# Patient Record
Sex: MALE | Race: OTHER | HISPANIC OR LATINO | URBAN - METROPOLITAN AREA
[De-identification: names, ages, dates, MRNs, and addresses within clinical notes are randomized per-mention and may not be internally consistent; named-entity substitution may affect disease eponyms.]

---

## 2022-05-08 ENCOUNTER — EMERGENCY (EMERGENCY)
Facility: HOSPITAL | Age: 47
LOS: 1 days | Discharge: HOME | End: 2022-05-08
Attending: EMERGENCY MEDICINE
Payer: SELF-PAY

## 2022-05-08 VITALS
TEMPERATURE: 99 F | HEART RATE: 96 BPM | SYSTOLIC BLOOD PRESSURE: 197 MMHG | OXYGEN SATURATION: 99 % | RESPIRATION RATE: 16 BRPM | DIASTOLIC BLOOD PRESSURE: 111 MMHG

## 2022-05-08 VITALS
TEMPERATURE: 98 F | HEART RATE: 120 BPM | DIASTOLIC BLOOD PRESSURE: 127 MMHG | SYSTOLIC BLOOD PRESSURE: 196 MMHG | OXYGEN SATURATION: 97 % | HEIGHT: 65 IN | RESPIRATION RATE: 20 BRPM

## 2022-05-08 LAB
ALBUMIN SERPL ELPH-MCNC: 4.6 G/DL — SIGNIFICANT CHANGE UP (ref 3.3–5)
ALP SERPL-CCNC: 111 U/L — SIGNIFICANT CHANGE UP (ref 40–120)
ALT FLD-CCNC: 60 U/L — HIGH (ref 10–45)
ANION GAP SERPL CALC-SCNC: 17 MMOL/L — SIGNIFICANT CHANGE UP (ref 5–17)
APPEARANCE UR: CLEAR — SIGNIFICANT CHANGE UP
APTT BLD: 27.9 SEC — SIGNIFICANT CHANGE UP (ref 27.5–35.5)
AST SERPL-CCNC: 73 U/L — HIGH (ref 10–40)
BACTERIA # UR AUTO: NEGATIVE — SIGNIFICANT CHANGE UP
BASE EXCESS BLDV CALC-SCNC: 2.6 MMOL/L — HIGH (ref -2–2)
BASE EXCESS BLDV CALC-SCNC: 3.9 MMOL/L — HIGH (ref -2–2)
BASOPHILS # BLD AUTO: 0.06 K/UL — SIGNIFICANT CHANGE UP (ref 0–0.2)
BASOPHILS NFR BLD AUTO: 0.8 % — SIGNIFICANT CHANGE UP (ref 0–2)
BILIRUB SERPL-MCNC: 0.5 MG/DL — SIGNIFICANT CHANGE UP (ref 0.2–1.2)
BILIRUB UR-MCNC: NEGATIVE — SIGNIFICANT CHANGE UP
BUN SERPL-MCNC: 10 MG/DL — SIGNIFICANT CHANGE UP (ref 7–23)
CA-I SERPL-SCNC: 1.04 MMOL/L — LOW (ref 1.15–1.33)
CA-I SERPL-SCNC: 1.15 MMOL/L — SIGNIFICANT CHANGE UP (ref 1.15–1.33)
CALCIUM SERPL-MCNC: 8.9 MG/DL — SIGNIFICANT CHANGE UP (ref 8.4–10.5)
CHLORIDE BLDV-SCNC: 104 MMOL/L — SIGNIFICANT CHANGE UP (ref 96–108)
CHLORIDE BLDV-SCNC: 106 MMOL/L — SIGNIFICANT CHANGE UP (ref 96–108)
CHLORIDE SERPL-SCNC: 100 MMOL/L — SIGNIFICANT CHANGE UP (ref 96–108)
CO2 BLDV-SCNC: 28 MMOL/L — HIGH (ref 22–26)
CO2 BLDV-SCNC: 29 MMOL/L — HIGH (ref 22–26)
CO2 SERPL-SCNC: 22 MMOL/L — SIGNIFICANT CHANGE UP (ref 22–31)
COLOR SPEC: SIGNIFICANT CHANGE UP
CREAT SERPL-MCNC: 0.72 MG/DL — SIGNIFICANT CHANGE UP (ref 0.5–1.3)
DIFF PNL FLD: NEGATIVE — SIGNIFICANT CHANGE UP
EGFR: 113 ML/MIN/1.73M2 — SIGNIFICANT CHANGE UP
EOSINOPHIL # BLD AUTO: 0.08 K/UL — SIGNIFICANT CHANGE UP (ref 0–0.5)
EOSINOPHIL NFR BLD AUTO: 1 % — SIGNIFICANT CHANGE UP (ref 0–6)
EPI CELLS # UR: 1 /HPF — SIGNIFICANT CHANGE UP
GAS PNL BLDV: 140 MMOL/L — SIGNIFICANT CHANGE UP (ref 136–145)
GAS PNL BLDV: 141 MMOL/L — SIGNIFICANT CHANGE UP (ref 136–145)
GAS PNL BLDV: SIGNIFICANT CHANGE UP
GLUCOSE BLDV-MCNC: 121 MG/DL — HIGH (ref 70–99)
GLUCOSE BLDV-MCNC: 99 MG/DL — SIGNIFICANT CHANGE UP (ref 70–99)
GLUCOSE SERPL-MCNC: 107 MG/DL — HIGH (ref 70–99)
GLUCOSE UR QL: ABNORMAL
HCO3 BLDV-SCNC: 27 MMOL/L — SIGNIFICANT CHANGE UP (ref 22–29)
HCO3 BLDV-SCNC: 28 MMOL/L — SIGNIFICANT CHANGE UP (ref 22–29)
HCT VFR BLD CALC: 42 % — SIGNIFICANT CHANGE UP (ref 39–50)
HCT VFR BLDA CALC: 41 % — SIGNIFICANT CHANGE UP (ref 39–51)
HCT VFR BLDA CALC: 44 % — SIGNIFICANT CHANGE UP (ref 39–51)
HGB BLD CALC-MCNC: 13.7 G/DL — SIGNIFICANT CHANGE UP (ref 12.6–17.4)
HGB BLD CALC-MCNC: 14.7 G/DL — SIGNIFICANT CHANGE UP (ref 12.6–17.4)
HGB BLD-MCNC: 14.1 G/DL — SIGNIFICANT CHANGE UP (ref 13–17)
IMM GRANULOCYTES NFR BLD AUTO: 0.5 % — SIGNIFICANT CHANGE UP (ref 0–1.5)
INR BLD: 0.91 RATIO — SIGNIFICANT CHANGE UP (ref 0.88–1.16)
KETONES UR-MCNC: NEGATIVE — SIGNIFICANT CHANGE UP
LACTATE BLDV-MCNC: 1.6 MMOL/L — SIGNIFICANT CHANGE UP (ref 0.7–2)
LACTATE BLDV-MCNC: 2.5 MMOL/L — HIGH (ref 0.7–2)
LEUKOCYTE ESTERASE UR-ACNC: NEGATIVE — SIGNIFICANT CHANGE UP
LIDOCAIN IGE QN: 79 U/L — HIGH (ref 7–60)
LYMPHOCYTES # BLD AUTO: 1.58 K/UL — SIGNIFICANT CHANGE UP (ref 1–3.3)
LYMPHOCYTES # BLD AUTO: 20 % — SIGNIFICANT CHANGE UP (ref 13–44)
MCHC RBC-ENTMCNC: 29 PG — SIGNIFICANT CHANGE UP (ref 27–34)
MCHC RBC-ENTMCNC: 33.6 GM/DL — SIGNIFICANT CHANGE UP (ref 32–36)
MCV RBC AUTO: 86.4 FL — SIGNIFICANT CHANGE UP (ref 80–100)
MONOCYTES # BLD AUTO: 0.43 K/UL — SIGNIFICANT CHANGE UP (ref 0–0.9)
MONOCYTES NFR BLD AUTO: 5.4 % — SIGNIFICANT CHANGE UP (ref 2–14)
NEUTROPHILS # BLD AUTO: 5.71 K/UL — SIGNIFICANT CHANGE UP (ref 1.8–7.4)
NEUTROPHILS NFR BLD AUTO: 72.3 % — SIGNIFICANT CHANGE UP (ref 43–77)
NITRITE UR-MCNC: NEGATIVE — SIGNIFICANT CHANGE UP
NRBC # BLD: 0 /100 WBCS — SIGNIFICANT CHANGE UP (ref 0–0)
PCO2 BLDV: 38 MMHG — LOW (ref 42–55)
PCO2 BLDV: 41 MMHG — LOW (ref 42–55)
PH BLDV: 7.43 — SIGNIFICANT CHANGE UP (ref 7.32–7.43)
PH BLDV: 7.47 — HIGH (ref 7.32–7.43)
PH UR: 6.5 — SIGNIFICANT CHANGE UP (ref 5–8)
PLATELET # BLD AUTO: 155 K/UL — SIGNIFICANT CHANGE UP (ref 150–400)
PO2 BLDV: 71 MMHG — HIGH (ref 25–45)
PO2 BLDV: 75 MMHG — HIGH (ref 25–45)
POTASSIUM BLDV-SCNC: 3.4 MMOL/L — LOW (ref 3.5–5.1)
POTASSIUM BLDV-SCNC: 3.8 MMOL/L — SIGNIFICANT CHANGE UP (ref 3.5–5.1)
POTASSIUM SERPL-MCNC: 3.6 MMOL/L — SIGNIFICANT CHANGE UP (ref 3.5–5.3)
POTASSIUM SERPL-SCNC: 3.6 MMOL/L — SIGNIFICANT CHANGE UP (ref 3.5–5.3)
PROT SERPL-MCNC: 6.7 G/DL — SIGNIFICANT CHANGE UP (ref 6–8.3)
PROT UR-MCNC: ABNORMAL
PROTHROM AB SERPL-ACNC: 10.5 SEC — SIGNIFICANT CHANGE UP (ref 10.5–13.4)
RBC # BLD: 4.86 M/UL — SIGNIFICANT CHANGE UP (ref 4.2–5.8)
RBC # FLD: 12.1 % — SIGNIFICANT CHANGE UP (ref 10.3–14.5)
RBC CASTS # UR COMP ASSIST: 1 /HPF — SIGNIFICANT CHANGE UP (ref 0–4)
SAO2 % BLDV: 95.5 % — HIGH (ref 67–88)
SAO2 % BLDV: 95.5 % — HIGH (ref 67–88)
SARS-COV-2 RNA SPEC QL NAA+PROBE: SIGNIFICANT CHANGE UP
SODIUM SERPL-SCNC: 139 MMOL/L — SIGNIFICANT CHANGE UP (ref 135–145)
SP GR SPEC: 1.04 — HIGH (ref 1.01–1.02)
UROBILINOGEN FLD QL: NEGATIVE — SIGNIFICANT CHANGE UP
WBC # BLD: 7.9 K/UL — SIGNIFICANT CHANGE UP (ref 3.8–10.5)
WBC # FLD AUTO: 7.9 K/UL — SIGNIFICANT CHANGE UP (ref 3.8–10.5)
WBC UR QL: 0 /HPF — SIGNIFICANT CHANGE UP (ref 0–5)

## 2022-05-08 PROCEDURE — 36415 COLL VENOUS BLD VENIPUNCTURE: CPT

## 2022-05-08 PROCEDURE — 74177 CT ABD & PELVIS W/CONTRAST: CPT | Mod: 26,MA

## 2022-05-08 PROCEDURE — 96374 THER/PROPH/DIAG INJ IV PUSH: CPT | Mod: XU

## 2022-05-08 PROCEDURE — 71045 X-RAY EXAM CHEST 1 VIEW: CPT

## 2022-05-08 PROCEDURE — 85730 THROMBOPLASTIN TIME PARTIAL: CPT

## 2022-05-08 PROCEDURE — 85610 PROTHROMBIN TIME: CPT

## 2022-05-08 PROCEDURE — 93005 ELECTROCARDIOGRAM TRACING: CPT | Mod: 76

## 2022-05-08 PROCEDURE — 81001 URINALYSIS AUTO W/SCOPE: CPT

## 2022-05-08 PROCEDURE — 74177 CT ABD & PELVIS W/CONTRAST: CPT | Mod: MA

## 2022-05-08 PROCEDURE — 82330 ASSAY OF CALCIUM: CPT

## 2022-05-08 PROCEDURE — 99285 EMERGENCY DEPT VISIT HI MDM: CPT | Mod: 25

## 2022-05-08 PROCEDURE — 71045 X-RAY EXAM CHEST 1 VIEW: CPT | Mod: 26

## 2022-05-08 PROCEDURE — 83605 ASSAY OF LACTIC ACID: CPT

## 2022-05-08 PROCEDURE — 87086 URINE CULTURE/COLONY COUNT: CPT

## 2022-05-08 PROCEDURE — U0003: CPT

## 2022-05-08 PROCEDURE — 85014 HEMATOCRIT: CPT

## 2022-05-08 PROCEDURE — 82803 BLOOD GASES ANY COMBINATION: CPT

## 2022-05-08 PROCEDURE — 83690 ASSAY OF LIPASE: CPT

## 2022-05-08 PROCEDURE — 93010 ELECTROCARDIOGRAM REPORT: CPT

## 2022-05-08 PROCEDURE — 84132 ASSAY OF SERUM POTASSIUM: CPT

## 2022-05-08 PROCEDURE — 85025 COMPLETE CBC W/AUTO DIFF WBC: CPT

## 2022-05-08 PROCEDURE — 85018 HEMOGLOBIN: CPT

## 2022-05-08 PROCEDURE — U0005: CPT

## 2022-05-08 PROCEDURE — 80053 COMPREHEN METABOLIC PANEL: CPT

## 2022-05-08 PROCEDURE — 96375 TX/PRO/DX INJ NEW DRUG ADDON: CPT | Mod: XU

## 2022-05-08 PROCEDURE — 82947 ASSAY GLUCOSE BLOOD QUANT: CPT

## 2022-05-08 PROCEDURE — 82435 ASSAY OF BLOOD CHLORIDE: CPT

## 2022-05-08 PROCEDURE — 84295 ASSAY OF SERUM SODIUM: CPT

## 2022-05-08 PROCEDURE — 99285 EMERGENCY DEPT VISIT HI MDM: CPT

## 2022-05-08 RX ORDER — SODIUM CHLORIDE 9 MG/ML
1000 INJECTION INTRAMUSCULAR; INTRAVENOUS; SUBCUTANEOUS ONCE
Refills: 0 | Status: COMPLETED | OUTPATIENT
Start: 2022-05-08 | End: 2022-05-08

## 2022-05-08 RX ORDER — FAMOTIDINE 10 MG/ML
20 INJECTION INTRAVENOUS ONCE
Refills: 0 | Status: COMPLETED | OUTPATIENT
Start: 2022-05-08 | End: 2022-05-08

## 2022-05-08 RX ORDER — MORPHINE SULFATE 50 MG/1
4 CAPSULE, EXTENDED RELEASE ORAL ONCE
Refills: 0 | Status: DISCONTINUED | OUTPATIENT
Start: 2022-05-08 | End: 2022-05-08

## 2022-05-08 RX ADMIN — MORPHINE SULFATE 4 MILLIGRAM(S): 50 CAPSULE, EXTENDED RELEASE ORAL at 13:28

## 2022-05-08 RX ADMIN — FAMOTIDINE 20 MILLIGRAM(S): 10 INJECTION INTRAVENOUS at 14:48

## 2022-05-08 RX ADMIN — Medication 30 MILLILITER(S): at 14:48

## 2022-05-08 RX ADMIN — Medication 25 MILLIGRAM(S): at 15:13

## 2022-05-08 RX ADMIN — SODIUM CHLORIDE 1000 MILLILITER(S): 9 INJECTION INTRAMUSCULAR; INTRAVENOUS; SUBCUTANEOUS at 12:28

## 2022-05-08 RX ADMIN — MORPHINE SULFATE 4 MILLIGRAM(S): 50 CAPSULE, EXTENDED RELEASE ORAL at 12:28

## 2022-05-08 NOTE — ED ADULT NURSE REASSESSMENT NOTE - NS ED NURSE REASSESS COMMENT FT1
Report received from MARICRUZ Diehl. Pt AAOx4, NAD, resp nonlabored, skin warm/dry, resting comfortably in bed with call bell at bedside. Pt denies headache, dizziness, chest pain, palpitations, SOB, abd pain, n/v/d, urinary symptoms, fevers, chills, weakness at this time. Pt awaiting dispo. Safety maintained.

## 2022-05-08 NOTE — ED PROVIDER NOTE - PROGRESS NOTE DETAILS
Chuy Salomon PGY-2 patient having elevated BP in ED, spoke to patient, chronic as per patient, does not follow up with any PMD. will provide PMD upon discharge Chuy Salomon PGY-2 pain improved, tolerating PO, dispo with plan to go to PCP for bp management and further gi pain control

## 2022-05-08 NOTE — ED PROVIDER NOTE - OBJECTIVE STATEMENT
[id number]     47 y/o M hx of alc abuse, last drink earlier today 2 drinks, depression p/w abd pain x 6 days. pain is throughout abd, worsened today, now 10/10. decrease appetite. otherwise pt denies any fever, chills, cp, palpitations, sob, v/d, dysuria, numbness, headache

## 2022-05-08 NOTE — ED PROVIDER NOTE - PATIENT PORTAL LINK FT
You can access the FollowMyHealth Patient Portal offered by [facility] by registering at the following website: [email or web address]. By joining [employer]’s FollowMyHealth portal, you will also be able to view your health information using other applications (apps) compatible with our system.

## 2022-05-08 NOTE — ED PROVIDER NOTE - ATTENDING CONTRIBUTION TO CARE
attending Carlita: 46yM h/o alcohol abuse, last drink earlier today, depression p/w mid abd pain x 6 days. Assoc with decreased appetite. Exam as above. Differential including pancreatitis, gastritis, colitis, perforated ulcer, much less likely aortic pathology. Will obtain labs, symptomatic treatment, upright cxr, CT A/P, reassess

## 2022-05-08 NOTE — ED ADULT NURSE NOTE - NSIMPLEMENTINTERV_GEN_ALL_ED
Implemented All Fall Risk Interventions:  [place] to call system. Call bell, personal items and telephone within reach. Instruct patient to call for assistance. Room bathroom lighting operational. Non-slip footwear when patient is off stretcher. Physically safe environment: no spills, clutter or unnecessary equipment. Stretcher in lowest position, wheels locked, appropriate side rails in place. Provide visual cue, wrist band, yellow gown, etc. Monitor gait and stability. Monitor for mental status changes and reorient to person, place, and time. Review medications for side effects contributing to fall risk. Reinforce activity limits and safety measures with patient and family.

## 2022-05-08 NOTE — ED PROVIDER NOTE - NSFOLLOWUPINSTRUCTIONS_ED_ALL_ED_FT
Please take N20 bus on top [facility]       Abdominal Pain, Adult      Pain in the abdomen (abdominal pain) can be caused by many things. Often, abdominal pain is not serious and it gets better with no treatment or by being treated at home. However, sometimes abdominal pain is serious.    Your health care provider will ask questions about your medical history and do a physical exam to try to determine the cause of your abdominal pain.      Follow these instructions at home:    Medicines     •Take over-the-counter and prescription medicines only as told by your health care provider.      • Do not take a laxative unless told by your health care provider.        General instructions      •Watch your condition for any changes.      •Drink enough fluid to keep your urine pale yellow.      •Keep all follow-up visits as told by your health care provider. This is important.        Contact a health care provider if:    •Your abdominal pain changes or gets worse.      •You are not hungry or you lose weight without trying.      •You are constipated or have diarrhea for more than 2–3 days.      •You have pain when you urinate or have a bowel movement.      •Your abdominal pain wakes you up at night.      •Your pain gets worse with meals, after eating, or with certain foods.      •You are vomiting and cannot keep anything down.      •You have a fever.      •You have blood in your urine.        Get help right away if:    •Your pain does not go away as soon as your health care provider told you to expect.      •You cannot stop vomiting.      •Your pain is only in areas of the abdomen, such as the right side or the left lower portion of the abdomen. Pain on the right side could be caused by appendicitis.      •You have bloody or black stools, or stools that look like tar.      •You have severe pain, cramping, or bloating in your abdomen.    •You have signs of dehydration, such as:  •Dark urine, very little urine, or no urine.      •Cracked lips.      •Dry mouth.      •Sunken eyes.      •Sleepiness.      •Weakness.        •You have trouble breathing or chest pain.        Summary    •Often, abdominal pain is not serious and it gets better with no treatment or by being treated at home. However, sometimes abdominal pain is serious.      •Watch your condition for any changes.      •Take over-the-counter and prescription medicines only as told by your health care provider.      •Contact a health care provider if your abdominal pain changes or gets worse.      •Get help right away if you have severe pain, cramping, or bloating in your abdomen.      This information is not intended to replace advice given to you by your health care provider. Make sure you discuss any questions you have with your health care provider.

## 2022-05-08 NOTE — ED PROVIDER NOTE - NSFOLLOWUPCLINICS_GEN_ALL_ED_FT
[facility]  Family Medicine  [address] [place] [id number]  Phone: [phone]  Fax:     Gastroenterology at [facility]  Gastroenterology  [address] [place] [id number]  Phone: [phone]  Fax:

## 2022-05-08 NOTE — ED PROVIDER NOTE - CLINICAL SUMMARY MEDICAL DECISION MAKING FREE TEXT BOX
45 y/o M hx of alc abuse, last drink earlier today 2 drinks, depression p/w abd pain x 6 days.   Differential diagnosis includes: pancreatitis, lionel, PUD, perf viscous given severe pain   Plan: labs, UA, CT AP***, pain control, serial reassessment

## 2022-05-08 NOTE — ED ADULT NURSE NOTE - CCCP TRG CHIEF CMPLNT
Patient requests all Lab, Cardiology, and Radiology Results on their Discharge Instructions
abdominal pain

## 2022-05-08 NOTE — ED PROVIDER NOTE - PHYSICAL EXAMINATION
Vital signs reviewed  GENERAL: in distress due to pain  HEAD: NCAT  EYES: Anicteric  ENT: MMM  NECK: Supple, non tender  RESPIRATORY: Normal respiratory effort. CTA B/L. No wheezing, rales, rhonchi  CARDIOVASCULAR: Regular rate and rhythm  ABDOMEN: Soft. Nondistended. generalized tender, guarding   MUSCULOSKELETAL/EXTREMITIES: Brisk cap refill. 2+ radial pulses. No leg edema.  SKIN:  Warm and dry  NEURO: AAOx3. No gross FND.  PSYCHIATRIC: Cooperative. Affect appropriate.

## 2022-05-08 NOTE — ED ADULT NURSE NOTE - OBJECTIVE STATEMENT
46 year old male presented to ED [place] speaking,  [id number], with c/o of constant sharp abdominal pain x6 days, pt reports 'drinking is the only thing that helps the pain'. hx ETOH abuse, last drink today (2 drinks), hx depression. pt denies CP, SOB, nausea/vomiting, numbness/tingling, fever, cough, chills, dizziness, headache, blurred vision, neuro intact. pt a&ox3, lung sounds clear, heart rate regular, on cardiac monitor, EKG performed handed to MD, abdomen soft nontender nondistended to palp. skin intact. IV in right AC 20G and patent. Will continue to monitor and assess while offering support and reassurance.

## 2022-05-09 NOTE — ED POST DISCHARGE NOTE - RESULT SUMMARY
incidental findings: pulmonary density in the RLL, hepatic steatosis , diffuse bladder wall thickening.

## 2022-05-10 LAB
CULTURE RESULTS: SIGNIFICANT CHANGE UP
SPECIMEN SOURCE: SIGNIFICANT CHANGE UP

## 2022-06-20 NOTE — ED ADULT TRIAGE NOTE - PAIN RATING/NUMBER SCALE (0-10): ACTIVITY
Received Fax From:  [facility]  Cindy Muñoz RN   [address], [address]  [place] [address]  Direct(Phone):[phone]  Fax:[phone]    RE: Physician Order Needed    Message: Please review attached Plan of Care. If you agree with this plan, please sign, date and return to me directly.     Placed form in PCP Amairani Beaver NP mailbox for review and completion.    9

## 2024-05-25 ENCOUNTER — INPATIENT (INPATIENT)
Facility: HOSPITAL | Age: 49
LOS: 4 days | Discharge: HOME | DRG: 72 | End: 2024-05-30
Attending: HOSPITALIST | Admitting: STUDENT IN AN ORGANIZED HEALTH CARE EDUCATION/TRAINING PROGRAM
Payer: SELF-PAY

## 2024-05-25 VITALS
DIASTOLIC BLOOD PRESSURE: 109 MMHG | HEART RATE: 88 BPM | RESPIRATION RATE: 20 BRPM | SYSTOLIC BLOOD PRESSURE: 213 MMHG | TEMPERATURE: 98 F | OXYGEN SATURATION: 100 %

## 2024-05-25 DIAGNOSIS — R04.0 EPISTAXIS: ICD-10-CM

## 2024-05-25 DIAGNOSIS — G93.9 DISORDER OF BRAIN, UNSPECIFIED: ICD-10-CM

## 2024-05-25 DIAGNOSIS — G93.89 OTHER SPECIFIED DISORDERS OF BRAIN: ICD-10-CM

## 2024-05-25 DIAGNOSIS — F10.10 ALCOHOL ABUSE, UNCOMPLICATED: ICD-10-CM

## 2024-05-25 DIAGNOSIS — R56.9 UNSPECIFIED CONVULSIONS: ICD-10-CM

## 2024-05-25 DIAGNOSIS — Z29.9 ENCOUNTER FOR PROPHYLACTIC MEASURES, UNSPECIFIED: ICD-10-CM

## 2024-05-25 DIAGNOSIS — I10 ESSENTIAL (PRIMARY) HYPERTENSION: ICD-10-CM

## 2024-05-25 DIAGNOSIS — J01.90 ACUTE SINUSITIS, UNSPECIFIED: ICD-10-CM

## 2024-05-25 LAB
ALBUMIN SERPL ELPH-MCNC: 4.3 G/DL — SIGNIFICANT CHANGE UP (ref 3.3–5)
ALP SERPL-CCNC: 107 U/L — SIGNIFICANT CHANGE UP (ref 40–120)
ALT FLD-CCNC: 52 U/L — HIGH (ref 10–45)
AMPHET UR-MCNC: NEGATIVE — SIGNIFICANT CHANGE UP
ANION GAP SERPL CALC-SCNC: 19 MMOL/L — HIGH (ref 5–17)
APAP SERPL-MCNC: <15 UG/ML — SIGNIFICANT CHANGE UP (ref 10–30)
APPEARANCE UR: CLEAR — SIGNIFICANT CHANGE UP
APTT BLD: 25.6 SEC — SIGNIFICANT CHANGE UP (ref 24.5–35.6)
AST SERPL-CCNC: 61 U/L — HIGH (ref 10–40)
BACTERIA # UR AUTO: NEGATIVE /HPF — SIGNIFICANT CHANGE UP
BARBITURATES UR SCN-MCNC: NEGATIVE — SIGNIFICANT CHANGE UP
BASE EXCESS BLDV CALC-SCNC: 3.3 MMOL/L — HIGH (ref -2–3)
BASOPHILS # BLD AUTO: 0.04 K/UL — SIGNIFICANT CHANGE UP (ref 0–0.2)
BASOPHILS NFR BLD AUTO: 0.7 % — SIGNIFICANT CHANGE UP (ref 0–2)
BENZODIAZ UR-MCNC: NEGATIVE — SIGNIFICANT CHANGE UP
BILIRUB SERPL-MCNC: 1.3 MG/DL — HIGH (ref 0.2–1.2)
BILIRUB UR-MCNC: NEGATIVE — SIGNIFICANT CHANGE UP
BUN SERPL-MCNC: 8 MG/DL — SIGNIFICANT CHANGE UP (ref 7–23)
CA-I SERPL-SCNC: 1.06 MMOL/L — LOW (ref 1.15–1.33)
CALCIUM SERPL-MCNC: 8.6 MG/DL — SIGNIFICANT CHANGE UP (ref 8.4–10.5)
CAST: 0 /LPF — SIGNIFICANT CHANGE UP (ref 0–4)
CHLORIDE BLDV-SCNC: 93 MMOL/L — LOW (ref 96–108)
CHLORIDE SERPL-SCNC: 91 MMOL/L — LOW (ref 96–108)
CO2 BLDV-SCNC: 29 MMOL/L — HIGH (ref 22–26)
CO2 SERPL-SCNC: 23 MMOL/L — SIGNIFICANT CHANGE UP (ref 22–31)
COCAINE METAB.OTHER UR-MCNC: NEGATIVE — SIGNIFICANT CHANGE UP
COLOR SPEC: YELLOW — SIGNIFICANT CHANGE UP
CREAT SERPL-MCNC: 0.81 MG/DL — SIGNIFICANT CHANGE UP (ref 0.5–1.3)
DIFF PNL FLD: ABNORMAL
EGFR: 107 ML/MIN/1.73M2 — SIGNIFICANT CHANGE UP
EOSINOPHIL # BLD AUTO: 0.02 K/UL — SIGNIFICANT CHANGE UP (ref 0–0.5)
EOSINOPHIL NFR BLD AUTO: 0.3 % — SIGNIFICANT CHANGE UP (ref 0–6)
ETHANOL SERPL-MCNC: <10 MG/DL — SIGNIFICANT CHANGE UP (ref 0–10)
FLUAV AG NPH QL: SIGNIFICANT CHANGE UP
FLUBV AG NPH QL: SIGNIFICANT CHANGE UP
GAS PNL BLDV: 129 MMOL/L — LOW (ref 136–145)
GAS PNL BLDV: SIGNIFICANT CHANGE UP
GLUCOSE BLDV-MCNC: 235 MG/DL — HIGH (ref 70–99)
GLUCOSE SERPL-MCNC: 232 MG/DL — HIGH (ref 70–99)
GLUCOSE UR QL: 500 MG/DL
HCO3 BLDV-SCNC: 28 MMOL/L — SIGNIFICANT CHANGE UP (ref 22–29)
HCT VFR BLD CALC: 40.4 % — SIGNIFICANT CHANGE UP (ref 39–50)
HCT VFR BLDA CALC: 42 % — SIGNIFICANT CHANGE UP (ref 39–51)
HGB BLD CALC-MCNC: 14.1 G/DL — SIGNIFICANT CHANGE UP (ref 12.6–17.4)
HGB BLD-MCNC: 13.6 G/DL — SIGNIFICANT CHANGE UP (ref 13–17)
IMM GRANULOCYTES NFR BLD AUTO: 0.5 % — SIGNIFICANT CHANGE UP (ref 0–0.9)
INR BLD: 1.03 RATIO — SIGNIFICANT CHANGE UP (ref 0.85–1.18)
KETONES UR-MCNC: ABNORMAL MG/DL
LACTATE BLDV-MCNC: 1.5 MMOL/L — SIGNIFICANT CHANGE UP (ref 0.5–2)
LACTATE BLDV-MCNC: 5 MMOL/L — CRITICAL HIGH (ref 0.5–2)
LEUKOCYTE ESTERASE UR-ACNC: NEGATIVE — SIGNIFICANT CHANGE UP
LIDOCAIN IGE QN: 51 U/L — SIGNIFICANT CHANGE UP (ref 7–60)
LYMPHOCYTES # BLD AUTO: 0.65 K/UL — LOW (ref 1–3.3)
LYMPHOCYTES # BLD AUTO: 10.9 % — LOW (ref 13–44)
MAGNESIUM SERPL-MCNC: 2 MG/DL — SIGNIFICANT CHANGE UP (ref 1.6–2.6)
MCHC RBC-ENTMCNC: 28.9 PG — SIGNIFICANT CHANGE UP (ref 27–34)
MCHC RBC-ENTMCNC: 33.7 GM/DL — SIGNIFICANT CHANGE UP (ref 32–36)
MCV RBC AUTO: 85.8 FL — SIGNIFICANT CHANGE UP (ref 80–100)
METHADONE UR-MCNC: NEGATIVE — SIGNIFICANT CHANGE UP
MONOCYTES # BLD AUTO: 0.34 K/UL — SIGNIFICANT CHANGE UP (ref 0–0.9)
MONOCYTES NFR BLD AUTO: 5.7 % — SIGNIFICANT CHANGE UP (ref 2–14)
NEUTROPHILS # BLD AUTO: 4.86 K/UL — SIGNIFICANT CHANGE UP (ref 1.8–7.4)
NEUTROPHILS NFR BLD AUTO: 81.9 % — HIGH (ref 43–77)
NITRITE UR-MCNC: NEGATIVE — SIGNIFICANT CHANGE UP
NRBC # BLD: 0 /100 WBCS — SIGNIFICANT CHANGE UP (ref 0–0)
OPIATES UR-MCNC: NEGATIVE — SIGNIFICANT CHANGE UP
OXYCODONE UR-MCNC: NEGATIVE — SIGNIFICANT CHANGE UP
PCO2 BLDV: 41 MMHG — LOW (ref 42–55)
PCP SPEC-MCNC: SIGNIFICANT CHANGE UP
PCP UR-MCNC: NEGATIVE — SIGNIFICANT CHANGE UP
PH BLDV: 7.44 — HIGH (ref 7.32–7.43)
PH UR: 7 — SIGNIFICANT CHANGE UP (ref 5–8)
PLATELET # BLD AUTO: 130 K/UL — LOW (ref 150–400)
PO2 BLDV: 65 MMHG — HIGH (ref 25–45)
POTASSIUM BLDV-SCNC: 3 MMOL/L — LOW (ref 3.5–5.1)
POTASSIUM SERPL-MCNC: 3 MMOL/L — LOW (ref 3.5–5.3)
POTASSIUM SERPL-SCNC: 3 MMOL/L — LOW (ref 3.5–5.3)
PROT SERPL-MCNC: 7.4 G/DL — SIGNIFICANT CHANGE UP (ref 6–8.3)
PROT UR-MCNC: 30 MG/DL
PROTHROM AB SERPL-ACNC: 11.3 SEC — SIGNIFICANT CHANGE UP (ref 9.5–13)
RBC # BLD: 4.71 M/UL — SIGNIFICANT CHANGE UP (ref 4.2–5.8)
RBC # FLD: 12.6 % — SIGNIFICANT CHANGE UP (ref 10.3–14.5)
RBC CASTS # UR COMP ASSIST: 0 /HPF — SIGNIFICANT CHANGE UP (ref 0–4)
REVIEW: SIGNIFICANT CHANGE UP
RSV RNA NPH QL NAA+NON-PROBE: SIGNIFICANT CHANGE UP
SALICYLATES SERPL-MCNC: <2 MG/DL — LOW (ref 15–30)
SAO2 % BLDV: 93.5 % — HIGH (ref 67–88)
SARS-COV-2 RNA SPEC QL NAA+PROBE: SIGNIFICANT CHANGE UP
SODIUM SERPL-SCNC: 133 MMOL/L — LOW (ref 135–145)
SP GR SPEC: 1.01 — SIGNIFICANT CHANGE UP (ref 1–1.03)
SQUAMOUS # UR AUTO: 0 /HPF — SIGNIFICANT CHANGE UP (ref 0–5)
THC UR QL: NEGATIVE — SIGNIFICANT CHANGE UP
TROPONIN T, HIGH SENSITIVITY RESULT: 28 NG/L — SIGNIFICANT CHANGE UP (ref 0–51)
TSH SERPL-MCNC: 0.23 UIU/ML — LOW (ref 0.27–4.2)
UROBILINOGEN FLD QL: 1 MG/DL — SIGNIFICANT CHANGE UP (ref 0.2–1)
WBC # BLD: 5.94 K/UL — SIGNIFICANT CHANGE UP (ref 3.8–10.5)
WBC # FLD AUTO: 5.94 K/UL — SIGNIFICANT CHANGE UP (ref 3.8–10.5)
WBC UR QL: 0 /HPF — SIGNIFICANT CHANGE UP (ref 0–5)

## 2024-05-25 PROCEDURE — 74177 CT ABD & PELVIS W/CONTRAST: CPT | Mod: 26,MC

## 2024-05-25 PROCEDURE — 99285 EMERGENCY DEPT VISIT HI MDM: CPT

## 2024-05-25 PROCEDURE — 76377 3D RENDER W/INTRP POSTPROCES: CPT | Mod: 26

## 2024-05-25 PROCEDURE — 70450 CT HEAD/BRAIN W/O DYE: CPT | Mod: 26,MC

## 2024-05-25 PROCEDURE — 71045 X-RAY EXAM CHEST 1 VIEW: CPT | Mod: 26

## 2024-05-25 PROCEDURE — 70486 CT MAXILLOFACIAL W/O DYE: CPT | Mod: 26,MC

## 2024-05-25 PROCEDURE — ZZZZZ: CPT

## 2024-05-25 RX ORDER — LABETALOL HCL 100 MG
10 TABLET ORAL ONCE
Refills: 0 | Status: COMPLETED | OUTPATIENT
Start: 2024-05-25 | End: 2024-05-25

## 2024-05-25 RX ORDER — POTASSIUM CHLORIDE 20 MEQ
40 PACKET (EA) ORAL ONCE
Refills: 0 | Status: COMPLETED | OUTPATIENT
Start: 2024-05-25 | End: 2024-05-25

## 2024-05-25 RX ORDER — SODIUM CHLORIDE 9 MG/ML
500 INJECTION INTRAMUSCULAR; INTRAVENOUS; SUBCUTANEOUS ONCE
Refills: 0 | Status: COMPLETED | OUTPATIENT
Start: 2024-05-25 | End: 2024-05-25

## 2024-05-25 RX ORDER — LEVETIRACETAM 250 MG/1
1000 TABLET, FILM COATED ORAL ONCE
Refills: 0 | Status: COMPLETED | OUTPATIENT
Start: 2024-05-25 | End: 2024-05-25

## 2024-05-25 RX ORDER — HYDRALAZINE HCL 50 MG
10 TABLET ORAL ONCE
Refills: 0 | Status: COMPLETED | OUTPATIENT
Start: 2024-05-25 | End: 2024-05-25

## 2024-05-25 RX ORDER — LABETALOL HCL 100 MG
10 TABLET ORAL ONCE
Refills: 0 | Status: DISCONTINUED | OUTPATIENT
Start: 2024-05-25 | End: 2024-05-25

## 2024-05-25 RX ADMIN — LEVETIRACETAM 400 MILLIGRAM(S): 250 TABLET, FILM COATED ORAL at 21:29

## 2024-05-25 RX ADMIN — Medication 10 MILLIGRAM(S): at 17:30

## 2024-05-25 RX ADMIN — Medication 10 MILLIGRAM(S): at 22:01

## 2024-05-25 RX ADMIN — SODIUM CHLORIDE 500 MILLILITER(S): 9 INJECTION INTRAMUSCULAR; INTRAVENOUS; SUBCUTANEOUS at 16:04

## 2024-05-25 RX ADMIN — Medication 40 MILLIEQUIVALENT(S): at 18:57

## 2024-05-25 NOTE — H&P ADULT - ASSESSMENT
48 YOM with HTN, smoker, alcohol use disorder who presents today after witnessed syncope with seizure like activity, found to have left temporal lesion with surrounding vasogenic edema and periapical abscesses with acute on chronic sinusitis.

## 2024-05-25 NOTE — H&P ADULT - ATTENDING COMMENTS
47yo 73kg m, current smoker + alcohol use, w no known pmh, p/w tloc w witnessed seizure like activity; in er, found to have Periapical abscesses with acute on chronic sinusitis and Left temporal lesion with surrounding vasogenic edema (infection/abscess vs metastatic disease); c/f component of alcohol withdrawal; admit to medicine for further mgmt.     l temporal lesion    seizure    periapical abscess  anx as above  dental consult in am    acute on chronic sinusitis  symptomatic relief with analgesics/antipyretics, Flonase BID, intranasal saline 2gtts and 4x/day to b/l nares  abx as above  ent follow up in am    ?component of withdrawal  a/w thrombocytopenia, lytes insufficiencies (hypona, hypok, hypocl), a/w elevated lfts (tbili, ast:alt < 2:1)  Bac, utox wnl   neuroimaging as above  abdominal imaging shows hepatic steatosis  follow up cpk  avoid hepatotoxic agents;   trend lfts daily  ciwa protocol of symptom triggered therapy with po/ivp ativan   multivitamin, thiamine, folic acid supplementation  symptomatic relief with ppi, antiemetics, analgesics as needed  aggressive ivf resuscitation + lytes as needed; encourage po intake  fall, seizure, delirium, aspiration precaution with head end of bed elevated  psych consult  pt/ot eval + sw/cm consult for disposition     htn urgency  asymptomatic; no evidence of end organ damage  ekg shows nsr, lvh + repolarization changes, qtc ~522ms; trop 28->38  patient likely with long standing severely elevated bp; ?component of withdrawal  follow up lipid profile, a1c, tsh  monitor vitals q4h  start cozaar    ciwa protocol as above  slowly lower bp with goal bp ~160/100 w/n first 24-48 hours; titrate antihtn accordingly     otherwise, concur w a+p as described by resident above 49yo 73kg m, current smoker + alcohol use, w no known pmh, p/w tloc w witnessed seizure like activity; in er, found to have Periapical abscesses with acute on chronic sinusitis and Left temporal lesion with surrounding vasogenic edema (infection/abscess vs metastatic disease); c/f component of alcohol withdrawal; admit to medicine for further mgmt.     suspected seizure iso l temporal lesion    periapical abscess  abx as above  dental consult in am    acute on chronic sinusitis  symptomatic relief with analgesics/antipyretics, Flonase BID, intranasal saline 2gtts and 4x/day to b/l nares  abx as above  ent follow up in am    ?component of withdrawal  a/w thrombocytopenia, lytes insufficiencies (hypona, hypok, hypocl), a/w elevated lfts (tbili, ast:alt < 2:1)  Bac, utox wnl   neuroimaging as above  abdominal imaging shows hepatic steatosis  follow up cpk  avoid hepatotoxic agents;   trend lfts daily  ciwa protocol of symptom triggered therapy with po/ivp ativan   multivitamin, thiamine, folic acid supplementation  symptomatic relief with ppi, antiemetics, analgesics as needed  aggressive ivf resuscitation + lytes as needed; encourage po intake  fall, seizure, delirium, aspiration precaution with head end of bed elevated  psych consult  pt/ot eval + sw/cm consult for disposition     htn urgency  asymptomatic; no evidence of end organ damage  ekg shows nsr, lvh + repolarization changes, qtc ~522ms; trop 28->38  patient likely with long standing severely elevated bp; ?component of withdrawal  follow up lipid profile, a1c, tsh  monitor vitals q4h  start cozaar    ciwa protocol as above  slowly lower bp with goal bp ~160/100 w/n first 24-48 hours; titrate antihtn accordingly     otherwise, concur w a+p as described by resident above 49yo 73kg m, current smoker + alcohol use, w no known pmh, p/w tloc w witnessed seizure like activity; in er, found to have Periapical abscesses with acute on chronic sinusitis and Left temporal lesion with surrounding vasogenic edema (infection/abscess vs metastatic disease); c/f component of alcohol withdrawal; admit to medicine for further mgmt.     suspected seizure iso l temporal lesion  neuroimaging w cth showing left temporal lesion w surrounding vasogenic edema, nonspecific and differential diagnostic considerations would include metastatic disease, abscess, evolving contusion, and an ischemic event.   otherwise, afebrile, no leukocytosis, hds  nsgy consulted by er  follow up blood cultures, mr brain, eeg  frequent neurochecks  maintain fall, seizure, delirium, aspiration precautions; keep head end of bed elevated  keppra 500 bid  vanc + rocephin + flagyl  ciwa protocol as described below; prn ativan for breakthrough seizures  nsgy follow up in am  id consult in am    periapical abscess  abx as above  dental consult in am    acute on chronic sinusitis  symptomatic relief with analgesics/antipyretics, Flonase BID, intranasal saline 2gtts and 4x/day to b/l nares  abx as above  ent follow up in am    ?component of withdrawal  a/w thrombocytopenia, lytes insufficiencies (hypona, hypok, hypocl), abnormal lfts (tbili, ast:alt < 2:1)  Bac, utox wnl   neuroimaging as above  abdominal imaging shows hepatic steatosis  follow up cpk  avoid hepatotoxic agents;   trend lfts daily  ciwa protocol of symptom triggered therapy with po/ivp ativan   multivitamin, thiamine, folic acid supplementation  symptomatic relief with ppi, antiemetics, analgesics as needed  aggressive ivf resuscitation + lytes as needed; encourage po intake  fall, seizure, delirium, aspiration precaution with head end of bed elevated  psych consult  pt/ot eval + sw/cm consult for disposition     htn urgency  asymptomatic; no evidence of end organ damage  ekg shows nsr, lvh + repolarization changes, qtc ~522ms; trop 28->38  patient likely with long standing severely elevated bp; ?component of withdrawal  follow up lipid profile, a1c, tsh  monitor vitals q4h  start cozaar    ciwa protocol as above  slowly lower bp with goal bp ~160/100 w/n first 24-48 hours; titrate antihtn accordingly     otherwise, concur w a+p as described by resident above

## 2024-05-25 NOTE — H&P ADULT - PROBLEM SELECTOR PLAN 4
- Pt drinks 1L of whiskey? nightly before bed. Unclear if he develops withdral seizures or DTs  - Last drink 5/24 evening?  - Monitor on CIWA  - Thiamine (high dose), folate, multivitamin - Pt drinks 1L of whiskey? nightly before bed. Unclear if he develops withdrawal seizures or DTs  - Last drink 5/24 evening?  - Monitor on CIWA  - Thiamine (high dose), folate, multivitamin

## 2024-05-25 NOTE — H&P ADULT - NSHPPHYSICALEXAM_GEN_ALL_CORE
VITALS:   T(C): 37 (05-25-24 @ 23:14), Max: 37.1 (05-25-24 @ 14:06)  HR: 67 (05-25-24 @ 23:14) (67 - 95)  BP: 214/104 (05-26-24 @ 00:09) (187/99 - 226/113)  RR: 20 (05-25-24 @ 23:14) (18 - 25)  SpO2: 98% (05-25-24 @ 23:14) (95% - 100%)    GENERAL: NAD, lying in bed comfortably, sleeping  HEAD: Atraumatic, Normocephalic  EYES: EOMI, PERRLA, conjunctiva and sclera clear  ENT: Dry mucous membranes, nontender over sinuses  NECK: Supple, No JVD  CHEST/LUNG: Clear to auscultation bilaterally; No crackles or wheezing  HEART: Regular rate and rhythm; No murmurs.  Peripheral edema: None  ABDOMEN: Soft, nontender, nondistended  EXTREMITIES:  2+ radial pulses. Thick hands  NERVOUS SYSTEM:  A&Ox3, no gross lateralizing deficits   SKIN: No rashes or lesions VITALS:   T(C): 37 (05-25-24 @ 23:14), Max: 37.1 (05-25-24 @ 14:06)  HR: 67 (05-25-24 @ 23:14) (67 - 95)  BP: 214/104 (05-26-24 @ 00:09) (187/99 - 226/113)  RR: 20 (05-25-24 @ 23:14) (18 - 25)  SpO2: 98% (05-25-24 @ 23:14) (95% - 100%)    GENERAL: NAD, lying in bed comfortably, sleeping  HEAD: Atraumatic, Normocephalic  EYES: EOMI, PERRLA, conjunctiva and sclera clear  ENT: Dry mucous membranes, nontender over sinuses. + b/l tongue laceration  NECK: Supple, No JVD  CHEST/LUNG: Clear to auscultation bilaterally; No crackles or wheezing  HEART: Regular rate and rhythm; No murmurs.  Peripheral edema: None  ABDOMEN: Soft, nontender, nondistended  EXTREMITIES:  2+ radial pulses. Thick hands  NERVOUS SYSTEM:  A&Ox3, no gross lateralizing deficits   SKIN: No rashes or lesions VITALS:   T(C): 37 (05-25-24 @ 23:14), Max: 37.1 (05-25-24 @ 14:06)  HR: 67 (05-25-24 @ 23:14) (67 - 95)  BP: 214/104 (05-26-24 @ 00:09) (187/99 - 226/113)  RR: 20 (05-25-24 @ 23:14) (18 - 25)  SpO2: 98% (05-25-24 @ 23:14) (95% - 100%)    GENERAL: NAD, lying in bed comfortably, sleeping  HEAD: Atraumatic, Normocephalic  EYES: EOMI, PERRLA, conjunctiva and sclera clear  ENT: Dry mucous membranes, nontender over sinuses. + b/l tongue laceration  NECK: Supple, No JVD  CHEST/LUNG: Clear to auscultation bilaterally; No crackles or wheezing  HEART: Regular rate and rhythm; No murmurs.  Peripheral edema: None  ABDOMEN: Soft, nontender, nondistended  EXTREMITIES:  2+ radial pulses. Thick hands  NERVOUS SYSTEM:  A&Ox2, no gross lateralizing deficits   SKIN: No rashes or lesions

## 2024-05-25 NOTE — ED ADULT NURSE NOTE - NSFALLRISKINTERV_ED_ALL_ED

## 2024-05-25 NOTE — CONSULT NOTE ADULT - SUBJECTIVE AND OBJECTIVE BOX
p (1480)     HPI:  48M hx ETOH abuse presents s/p witnessed LOC w/ shaking. Now AOx3, [place] strongly. Unclear last etoh use but clinically c/f withdrawl. CTH shows edema in the L temporal lobe.     --Anticoagulation:    =====================  PAST MEDICAL HISTORY   Alcohol abuse      PAST SURGICAL HISTORY     No Known Allergies      MEDICATIONS:  Antibiotics:    Neuro:  LORazepam     Tablet 2 milliGRAM(s) Oral every 2 hours PRN  LORazepam   Injectable 2 milliGRAM(s) IV Push every 1 hour PRN    Other:  hydrALAZINE Injectable 10 milliGRAM(s) IV Push Once      SOCIAL HISTORY:   Occupation:   Marital Status:     FAMILY HISTORY:      ROS: Negative except per HPI    LABS:  PT/INR - ( 25 May 2024 14:28 )   PT: 11.3 sec;   INR: 1.03 ratio         PTT - ( 25 May 2024 14:28 )  PTT:25.6 sec                        13.6   5.94  )-----------( 130      ( 25 May 2024 14:27 )             40.4     05-25    133<L>  |  91<L>  |  8   ----------------------------<  232<H>  3.0<L>   |  23  |  0.81    Ca    8.6      25 May 2024 14:27  Mg     2.0     05-25    TPro  7.4  /  Alb  4.3  /  TBili  1.3<H>  /  DBili  x   /  AST  61<H>  /  ALT  52<H>  /  AlkPhos  107  05-25

## 2024-05-25 NOTE — H&P ADULT - HISTORY OF PRESENT ILLNESS
ED Course:  Vitals: Afebrile, 's/110's, HR 79-95, RR 18-25 99%RA  Labs: CBC with plt 130, CMP with N1 133, K 3.0, Cl 91, slightly elevated LFTs, VBG with lactate 5-->1.5  UA not concerning for infection. Tox screen negative. RVP negative  CT H/max face with left temporal lesion with surrounding vasogenic edema and periapical abscesses with acute on chronic sinusitis.  CT A/P with hepatic steatosis.  Given 0.5L NS, keppra, labetalol and hydralazine Seen by neurosurgery and recommended for medicine admission. The patient is a 48 YOM with no medical history (no PMD), who was brought to the ED after falling down and having seizure like activity. Pt reports 2 weeks of total body fevers (did not check temp). He also reports slight cough, nasal congestion, phlegm. He thinks that he is in the hospital today because of memory loss, which he says started today. Pt also says that he coughed up blood today. Prior to his fall today he reports that he had some rice and then fell. He drinks 1 bottle of brown liquor every night before he goes to bed. When asked if it was whiskey he said yes. He also says that if he doesnt drink he develops belly burning.     ED Course:  Vitals: Afebrile, 's/110's, HR 79-95, RR 18-25 99%RA  Labs: CBC with plt 130, CMP with N1 133, K 3.0, Cl 91, slightly elevated LFTs, VBG with lactate 5-->1.5  UA not concerning for infection. Tox screen negative. RVP negative  CT H/max face with left temporal lesion with surrounding vasogenic edema and periapical abscesses with acute on chronic sinusitis.  CT A/P with hepatic steatosis.  Given 0.5L NS, keppra, labetalol and hydralazine Seen by neurosurgery and recommended for medicine admission.    On my exam in the ED, patient is comfortable and in no distress despite his HTN. He is sleeping and asked multiple times for food. Additionally, he is AOx2, does not know the year. Did not seem phased when told he had a brain mass.

## 2024-05-25 NOTE — CONSULT NOTE ADULT - ASSESSMENT
48-year-old Wol[place] speaking male no known past medical history on no medications presenting with episode of syncope with shaking witnessed by a coworker.  Pt is daily drinker but did not drink today. He denies any known history of seizure disorder, intracranial abnormality.  Does not follow with a PCP. ENT consulted for CT finding of acute on chronic sinusitis. History obtained with use of  #[id number]. Pt states that he has been feeling tired with fever, cough, nasal congestion and phlegm for about 2 weeks. Pt c/o post nasal drip with bitter taste in the back of his throat. He has tried tylenol and 'tea" with minimal improvement of his symptoms. Pt also c/o coughing up blood this morning which stopped spontaneously. WBC 5, afebrile. on nasal endoscopy, b/l nasal cavities with purulent discharge. good sensation. no fungal colonization visualized. b/l dried blood at anterior nares, no active bleeding.  48-year-old Japanese speaking male no known past medical history on no medications presenting with episode of syncope with shaking witnessed by a coworker.  Pt is daily drinker but did not drink today. He denies any known history of seizure disorder, intracranial abnormality.  Does not follow with a PCP. ENT consulted for CT finding of "acute on chronic sinusitis, potentially odontogenic". History obtained with use of  #[id number]. Pt states that he has been feeling tired with fever, cough, nasal congestion and phlegm for about 2 weeks. Pt c/o post nasal drip with bitter taste in the back of his throat. He has tried tylenol and 'tea" with minimal improvement of his symptoms. Pt also c/o coughing up blood this morning which stopped spontaneously. WBC 5, afebrile. on nasal endoscopy, b/l nasal cavities with purulent discharge. good sensation. no fungal colonization visualized. b/l dried blood at anterior nares, no active bleeding from nose or oropharynx. 48-year-old Kiswahili speaking male no known past medical history on no medications presenting with episode of syncope with shaking witnessed by a coworker.  Pt is daily drinker but did not drink today. He denies any known history of seizure disorder, intracranial abnormality.  Does not follow with a PCP. ENT consulted for CT finding of "acute on chronic sinusitis, potentially odontogenic". History obtained with use of  #[id number]. Pt states that he has been feeling tired with fever, cough, nasal congestion and phlegm for about 2 weeks. Pt c/o post nasal drip with bitter taste in the back of his throat. He has tried tylenol and 'tea" with minimal improvement of his symptoms. Pt also c/o coughing up blood this morning which stopped spontaneously. WBC 5, afebrile. on nasal endoscopy, b/l posterior middle meatus with minimal purulent discharge. good sensation. no fungal colonization visualized. b/l dried blood at anterior nares, no active bleeding from nose or oropharynx.

## 2024-05-25 NOTE — ED ADULT NURSE NOTE - OBJECTIVE STATEMENT
48y male, AAOx4, PMH ETOH abuse, presents BIBEMS status post seizure like activity, EMS reports witness patient eating and then convulsing, falling on face, pt awake and alert in ed, answers questions, denies headache, chest pain, shortness of breath, abdominal pain, on cm, 18g left wrist, placed in gown, side rails up for safety, bed in lowest position, call bell within reach, patient and family educated on plan of care, comfort and safety provided.

## 2024-05-25 NOTE — CONSULT NOTE ADULT - ASSESSMENT
Abby Hirsch     48M hx ETOH abuse presents s/p witnessed LOC w/ shaking. Now AOx3, M[place] strongly. Unclear last etoh use but clinically c/f withdrawl. CTH shows edema in the L temporal lobe.   -Medicine for withdrawl  -Keppra 500 BID  -MRI WWO brain

## 2024-05-25 NOTE — CONSULT NOTE ADULT - PROBLEM SELECTOR RECOMMENDATION 2
no active bleeding  Recommend nasal saline 1 spray to B/L nares TID   Afrin 2 sprays in b/l nares q12 hrs prn for epistaxis for 3 days maximum if no contradindications  Strict blood pressure control  Avoid nasal trauma: no nose rubbing or blowing. Sneeze with mouth open while pinching nares  Avoid bending with head below the waist  No heavy lifting  Call with questions or concerns [phone]

## 2024-05-25 NOTE — H&P ADULT - PROBLEM SELECTOR PLAN 6
Diet: Dash, consistent carb  DVT Proph:  Dispo: Pending course - , likely ISO hypokalemia  - Recheck EKG in am, avoid QT prolonging agents

## 2024-05-25 NOTE — ED PROVIDER NOTE - SHIFT CHANGE DETAILS
Attending MD Mon: 48M w EtOHic, s/p seizure, fell fwd hit face, ?post ictal but AAO, no signs of withdrawal, pending CTAP, CIWA, TBA

## 2024-05-25 NOTE — CONSULT NOTE ADULT - SUBJECTIVE AND OBJECTIVE BOX
CC: sinusitis on CT    HPI: 48-year-old Bulgarian speaking male no known past medical history on no medications presenting with episode of syncope with shaking witnessed by a coworker.  Pt is daily drinker but did not drink today. He denies any known history of seizure disorder, intracranial abnormality.  Does not follow with a PCP. ENT consulted for CT finding of acute on chronic sinusitis. History obtained with use of  #[id number]. Pt states that he has been feeling tired with fever, cough, nasal congestion and phlegm for about 2 weeks. Pt c/o post nasal drip with bitter taste in the back of his throat. He has tried tylenol and 'tea" with minimal improvement of his symptoms. Pt also c/o coughing up blood this morning which stopped spontaneously. Denies history of sinusitis. Denies N/V, nasal pain, rhinorrhea, facial pain, facial tenderness, headache, or vision changes. Denies dysphagia, odynophagia, dysphonia, SOB, dyspnea, changes in voice or inability to tolerate secretions.       PAST MEDICAL & SURGICAL HISTORY:  Alcohol abuse        Allergies    No Known Allergies    Intolerances      MEDICATIONS  (STANDING):    MEDICATIONS  (PRN):  LORazepam     Tablet 2 milliGRAM(s) Oral every 2 hours PRN CIWA-Ar score increase by 2 points and a total score of 7 or less  LORazepam   Injectable 2 milliGRAM(s) IV Push every 1 hour PRN CIWA-Ar score 8 or greater      Social History: unknown    Family history: unknown    ROS:   ENT: all negative except as noted in HPI   CV: denies palpitations  Pulm: denies SOB  GI: denies change in apetite, indigestion, n/v  : denies pertinent urinary symptoms, urgency  Neuro: denies numbness/tingling, loss of sensation  Psych: denies anxiety  MS: denies muscle weakness, instability  Heme: denies easy bruising or bleeding  Endo: denies heat/cold intolerance, excessive sweating  Vascular: denies LE edema    Vital Signs Last 24 Hrs  T(C): 37.1 (25 May 2024 16:05), Max: 37.1 (25 May 2024 14:06)  T(F): 98.8 (25 May 2024 16:05), Max: 98.8 (25 May 2024 16:05)  HR: 79 (25 May 2024 22:13) (79 - 95)  BP: 190/100 (25 May 2024 22:13) (190/100 - 226/113)  BP(mean): 137 (25 May 2024 16:05) (137 - 137)  RR: 20 (25 May 2024 17:23) (18 - 25)  SpO2: 98% (25 May 2024 17:23) (95% - 100%)    Parameters below as of 25 May 2024 17:23  Patient On (Oxygen Delivery Method): room air                              13.6   5.94  )-----------( 130      ( 25 May 2024 14:27 )             40.4    05-25    133<L>  |  91<L>  |  8   ----------------------------<  232<H>  3.0<L>   |  23  |  0.81    Ca    8.6      25 May 2024 14:27  Mg     2.0     05-25    TPro  7.4  /  Alb  4.3  /  TBili  1.3<H>  /  DBili  x   /  AST  61<H>  /  ALT  52<H>  /  AlkPhos  107  05-25   PT/INR - ( 25 May 2024 14:28 )   PT: 11.3 sec;   INR: 1.03 ratio         PTT - ( 25 May 2024 14:28 )  PTT:25.6 sec    PHYSICAL EXAM:  Gen: NAD  Skin: No rashes, bruises, or lesions  Head: Normocephalic, Atraumatic  Face: no edema, erythema, or fluctuance. Parotid glands soft without mass. nontender over frontal and maxillary sinuses.  Eyes: no scleral injection  Nose: b/l anterior nares with dried blood. no active bleeding.  Mouth: No Stridor / Drooling / Trismus.  Mucosa moist, tongue/uvula midline, oropharynx clear. no active bleeding.   Neck: supple, b/l cervical lymphadenopathy, trachea midline.   Resp: breathing easily, no stridor  CV: no peripheral edema/cyanosis  GI: nondistended   Peripheral vascular: no JVD or edema  Neuro: facial nerve intact, no facial droop        Fiberoptic Indirect Nasal Endoscopy  Indication: sinusitis on CT  Anterior rhinoscopy insufficient to account for symptoms  Verbal and/or written consent obtained from patient     b/l nasal cavities with purulent discharge. good sensation. no fungal colonization visualized. No polyps noted. b/l dried blood at anterior nares, no active bleeding.        < from: CT Maxillofacial No Cont (05.25.24 @ 18:34) >  FINDINGS:    A low-density lesion is noted in the left middle temporal gyrus, with   surrounding vasogenic edema. This measures approximately 2.5 x 1.1 x 2.3   cm, although the dimensions of the lesion are somewhat arbitrary due to   its indistinct margins. The appearance is nonspecific and differential   diagnostic considerations would include metastatic disease, abscess,   evolving contusion, and an ischemic event. Further characterization with   an MRI with gadolinium is recommended    Cerebral volume is within normal limits. No premature white matter   disease.    No acute intracranial hemorrhage. No midline shift or herniation. No CT   evidence of acute territorial infarction, although MRI with DWI would be   more sensitive. There is a prominent cisterna magna versus arachnoid cyst   in the left posterior fossa, a common incidental finding.    There are periapical lucencies in the left maxilla involving the roots of   the first right upper premolar and molar. The findings are nonspecific   and differential diagnostic considerations would include periapical   abscess, cyst, granuloma, as well as a variety of other less likely   etiologies. Correlate clinically.    There is moderate fluid and mucus in the left maxillary sinus, with mild   to moderate opacities in the ethmoid cavities, and the right maxillary   sinus. 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 The appearance is consistent with   acute on chronic sinusitis, potentially odontogenic in light of the   above. There is hyperdense material within the fluid/mucus, possibly   blood or fungal colonization.    There is straightening of the normal cervical lordosis, with narrowing of   the disc space and moderate bilateral osteophytic foraminal encroachment   at C5-6.    The facial bones are otherwise negative, without evidence of a fracture.   There is no TMJ dislocation. The maxillary, sphenoid, mandible, zygoma,   and nasal bones are negative.  The visualized portions of the frontal and   temporal bones are unremarkable. The walls of the orbits and maxillary   sinuses are intact. The visualized soft tissues of the face and upper   neck are otherwise negative. No incidental lytic or sclerotic lesions.    The globes, lenses, optic nerves, optic sheaths, orbital fat, extraocular   muscles, optic chiasm, suprasellar cistern, sella, and lacrimal glands   are otherwise grossly negative, although a contrast enhanced study would   be more sensitive. The calvarium is within normal limits.      IMPRESSION:    1.  Left temporal lesion with surrounding vasogenic edema.  2.  Recommend MRI with gadolinium to exclude malignancy/infection.  3.  Periapical abscesses with acute on chronic sinusitis.  4.  Correlate clinically to exclude fungal colonization.    Dr. Cheek called report to  Merary Phan  05/25/24 07:34 PM.    Patient Name: MARJORIE PINO  MRN: [id number], Accession: [id number]  DOS: 05/25/24 06:34 PM    --- End of Report ---    < end of copied text >   CC: sinusitis on CT    HPI: 48-year-old Latvian speaking male no known past medical history on no medications presenting with episode of syncope with shaking witnessed by a coworker.  Pt is daily drinker but did not drink today. He denies any known history of seizure disorder, intracranial abnormality.  Does not follow with a PCP. ENT consulted for CT finding of acute on chronic sinusitis. History obtained with use of  #[id number]. Pt states that he has been feeling tired with fever, cough, nasal congestion and phlegm for about 2 weeks. Pt c/o post nasal drip with bitter taste in the back of his throat. He has tried tylenol and 'tea" with minimal improvement of his symptoms. Pt also c/o coughing up blood this morning which stopped spontaneously. Denies history of sinusitis. Denies N/V, nasal pain, rhinorrhea, facial pain, facial tenderness, headache, or vision changes. Denies dysphagia, odynophagia, dysphonia, SOB, dyspnea, changes in voice or inability to tolerate secretions. Denies dental pain.      PAST MEDICAL & SURGICAL HISTORY:  Alcohol abuse        Allergies    No Known Allergies    Intolerances      MEDICATIONS  (STANDING):    MEDICATIONS  (PRN):  LORazepam     Tablet 2 milliGRAM(s) Oral every 2 hours PRN CIWA-Ar score increase by 2 points and a total score of 7 or less  LORazepam   Injectable 2 milliGRAM(s) IV Push every 1 hour PRN CIWA-Ar score 8 or greater      Social History: unknown    Family history: unknown    ROS:   ENT: all negative except as noted in HPI   CV: denies palpitations  Pulm: denies SOB  GI: denies change in apetite, indigestion, n/v  : denies pertinent urinary symptoms, urgency  Neuro: denies numbness/tingling, loss of sensation  Psych: denies anxiety  MS: denies muscle weakness, instability  Heme: denies easy bruising or bleeding  Endo: denies heat/cold intolerance, excessive sweating  Vascular: denies LE edema    Vital Signs Last 24 Hrs  T(C): 37.1 (25 May 2024 16:05), Max: 37.1 (25 May 2024 14:06)  T(F): 98.8 (25 May 2024 16:05), Max: 98.8 (25 May 2024 16:05)  HR: 79 (25 May 2024 22:13) (79 - 95)  BP: 190/100 (25 May 2024 22:13) (190/100 - 226/113)  BP(mean): 137 (25 May 2024 16:05) (137 - 137)  RR: 20 (25 May 2024 17:23) (18 - 25)  SpO2: 98% (25 May 2024 17:23) (95% - 100%)    Parameters below as of 25 May 2024 17:23  Patient On (Oxygen Delivery Method): room air                              13.6   5.94  )-----------( 130      ( 25 May 2024 14:27 )             40.4    05-25    133<L>  |  91<L>  |  8   ----------------------------<  232<H>  3.0<L>   |  23  |  0.81    Ca    8.6      25 May 2024 14:27  Mg     2.0     05-25    TPro  7.4  /  Alb  4.3  /  TBili  1.3<H>  /  DBili  x   /  AST  61<H>  /  ALT  52<H>  /  AlkPhos  107  05-25   PT/INR - ( 25 May 2024 14:28 )   PT: 11.3 sec;   INR: 1.03 ratio         PTT - ( 25 May 2024 14:28 )  PTT:25.6 sec    PHYSICAL EXAM:  Gen: NAD  Skin: No rashes, bruises, or lesions  Head: Normocephalic, Atraumatic  Face: no edema, erythema, or fluctuance. Parotid glands soft without mass. nontender over frontal and maxillary sinuses.  Eyes: no scleral injection  Nose: b/l anterior nares with dried blood. no active bleeding.  Mouth: No Stridor / Drooling / Trismus.  Mucosa moist, tongue/uvula midline, oropharynx clear. no active bleeding.   Neck: supple, b/l cervical lymphadenopathy, trachea midline.   Resp: breathing easily, no stridor  CV: no peripheral edema/cyanosis  GI: nondistended   Peripheral vascular: no JVD or edema  Neuro: facial nerve intact, no facial droop        Fiberoptic Indirect Nasal Endoscopy  Indication: sinusitis on CT  Anterior rhinoscopy insufficient to account for symptoms  Verbal and/or written consent obtained from patient     b/l nasal cavities with purulent discharge. good sensation. no fungal colonization visualized. No polyps noted. b/l dried blood at anterior nares, no active bleeding.        < from: CT Maxillofacial No Cont (05.25.24 @ 18:34) >  FINDINGS:    A low-density lesion is noted in the left middle temporal gyrus, with   surrounding vasogenic edema. This measures approximately 2.5 x 1.1 x 2.3   cm, although the dimensions of the lesion are somewhat arbitrary due to   its indistinct margins. The appearance is nonspecific and differential   diagnostic considerations would include metastatic disease, abscess,   evolving contusion, and an ischemic event. Further characterization with   an MRI with gadolinium is recommended    Cerebral volume is within normal limits. No premature white matter   disease.    No acute intracranial hemorrhage. No midline shift or herniation. No CT   evidence of acute territorial infarction, although MRI with DWI would be   more sensitive. There is a prominent cisterna magna versus arachnoid cyst   in the left posterior fossa, a common incidental finding.    There are periapical lucencies in the left maxilla involving the roots of   the first right upper premolar and molar. The findings are nonspecific   and differential diagnostic considerations would include periapical   abscess, cyst, granuloma, as well as a variety of other less likely   etiologies. Correlate clinically.    There is moderate fluid and mucus in the left maxillary sinus, with mild   to moderate opacities in the ethmoid cavities, and the right maxillary   sinus. 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 The appearance is consistent with   acute on chronic sinusitis, potentially odontogenic in light of the   above. There is hyperdense material within the fluid/mucus, possibly   blood or fungal colonization.    There is straightening of the normal cervical lordosis, with narrowing of   the disc space and moderate bilateral osteophytic foraminal encroachment   at C5-6.    The facial bones are otherwise negative, without evidence of a fracture.   There is no TMJ dislocation. The maxillary, sphenoid, mandible, zygoma,   and nasal bones are negative.  The visualized portions of the frontal and   temporal bones are unremarkable. The walls of the orbits and maxillary   sinuses are intact. The visualized soft tissues of the face and upper   neck are otherwise negative. No incidental lytic or sclerotic lesions.    The globes, lenses, optic nerves, optic sheaths, orbital fat, extraocular   muscles, optic chiasm, suprasellar cistern, sella, and lacrimal glands   are otherwise grossly negative, although a contrast enhanced study would   be more sensitive. The calvarium is within normal limits.      IMPRESSION:    1.  Left temporal lesion with surrounding vasogenic edema.  2.  Recommend MRI with gadolinium to exclude malignancy/infection.  3.  Periapical abscesses with acute on chronic sinusitis.  4.  Correlate clinically to exclude fungal colonization.    Dr. Cheek called report to  Merary Phan  05/25/24 07:34 PM.    Patient Name: MARJORIE PINO  MRN: [id number], Accession: [id number]  DOS: 05/25/24 06:34 PM    --- End of Report ---    < end of copied text >   CC: sinusitis on CT    HPI: 48-year-old Luxembourgish speaking male no known past medical history on no medications presenting with episode of syncope with shaking witnessed by a coworker.  Pt is daily drinker but did not drink today. He denies any known history of seizure disorder, intracranial abnormality.  Does not follow with a PCP. ENT consulted for CT finding of acute on chronic sinusitis. History obtained with use of  #[id number]. Pt states that he has been feeling tired with fever, cough, nasal congestion and phlegm for about 2 weeks. Pt c/o post nasal drip with bitter taste in the back of his throat. He has tried tylenol and 'tea" with minimal improvement of his symptoms. Pt also c/o coughing up blood this morning which stopped spontaneously. Denies history of sinusitis. Denies N/V, nasal pain, rhinorrhea, facial pain, facial tenderness, headache, or vision changes. Denies dysphagia, odynophagia, dysphonia, SOB, dyspnea, changes in voice or inability to tolerate secretions. Denies dental pain.      PAST MEDICAL & SURGICAL HISTORY:  Alcohol abuse        Allergies    No Known Allergies    Intolerances      MEDICATIONS  (STANDING):    MEDICATIONS  (PRN):  LORazepam     Tablet 2 milliGRAM(s) Oral every 2 hours PRN CIWA-Ar score increase by 2 points and a total score of 7 or less  LORazepam   Injectable 2 milliGRAM(s) IV Push every 1 hour PRN CIWA-Ar score 8 or greater      Social History: unknown    Family history: unknown    ROS:   ENT: all negative except as noted in HPI   CV: denies palpitations  Pulm: denies SOB  GI: denies change in apetite, indigestion, n/v  : denies pertinent urinary symptoms, urgency  Neuro: denies numbness/tingling, loss of sensation  Psych: denies anxiety  MS: denies muscle weakness, instability  Heme: denies easy bruising or bleeding  Endo: denies heat/cold intolerance, excessive sweating  Vascular: denies LE edema    Vital Signs Last 24 Hrs  T(C): 37.1 (25 May 2024 16:05), Max: 37.1 (25 May 2024 14:06)  T(F): 98.8 (25 May 2024 16:05), Max: 98.8 (25 May 2024 16:05)  HR: 79 (25 May 2024 22:13) (79 - 95)  BP: 190/100 (25 May 2024 22:13) (190/100 - 226/113)  BP(mean): 137 (25 May 2024 16:05) (137 - 137)  RR: 20 (25 May 2024 17:23) (18 - 25)  SpO2: 98% (25 May 2024 17:23) (95% - 100%)    Parameters below as of 25 May 2024 17:23  Patient On (Oxygen Delivery Method): room air                              13.6   5.94  )-----------( 130      ( 25 May 2024 14:27 )             40.4    05-25    133<L>  |  91<L>  |  8   ----------------------------<  232<H>  3.0<L>   |  23  |  0.81    Ca    8.6      25 May 2024 14:27  Mg     2.0     05-25    TPro  7.4  /  Alb  4.3  /  TBili  1.3<H>  /  DBili  x   /  AST  61<H>  /  ALT  52<H>  /  AlkPhos  107  05-25   PT/INR - ( 25 May 2024 14:28 )   PT: 11.3 sec;   INR: 1.03 ratio         PTT - ( 25 May 2024 14:28 )  PTT:25.6 sec    PHYSICAL EXAM:  Gen: NAD  Skin: No rashes, bruises, or lesions  Head: Normocephalic, Atraumatic  Face: no edema, erythema, or fluctuance. Parotid glands soft without mass. nontender over frontal and maxillary sinuses.  Eyes: no scleral injection  Nose: b/l anterior nares with dried blood. no active bleeding.  Mouth: No Stridor / Drooling / Trismus.  Mucosa moist, tongue/uvula midline, oropharynx clear. no active bleeding.   Neck: supple, b/l cervical lymphadenopathy, trachea midline.   Resp: breathing easily, no stridor  CV: no peripheral edema/cyanosis  GI: nondistended   Peripheral vascular: no JVD or edema  Neuro: facial nerve intact, no facial droop        Fiberoptic Indirect Nasal Endoscopy  Indication: sinusitis on CT  Anterior rhinoscopy insufficient to account for symptoms  Verbal and/or written consent obtained from patient     b/l nasal cavities with purulent discharge. good sensation. no fungal colonization visualized. mucosa pink and moist. No polyps noted. b/l dried blood at anterior nares, no active bleeding.        < from: CT Maxillofacial No Cont (05.25.24 @ 18:34) >  FINDINGS:    A low-density lesion is noted in the left middle temporal gyrus, with   surrounding vasogenic edema. This measures approximately 2.5 x 1.1 x 2.3   cm, although the dimensions of the lesion are somewhat arbitrary due to   its indistinct margins. The appearance is nonspecific and differential   diagnostic considerations would include metastatic disease, abscess,   evolving contusion, and an ischemic event. Further characterization with   an MRI with gadolinium is recommended    Cerebral volume is within normal limits. No premature white matter   disease.    No acute intracranial hemorrhage. No midline shift or herniation. No CT   evidence of acute territorial infarction, although MRI with DWI would be   more sensitive. There is a prominent cisterna magna versus arachnoid cyst   in the left posterior fossa, a common incidental finding.    There are periapical lucencies in the left maxilla involving the roots of   the first right upper premolar and molar. The findings are nonspecific   and differential diagnostic considerations would include periapical   abscess, cyst, granuloma, as well as a variety of other less likely   etiologies. Correlate clinically.    There is moderate fluid and mucus in the left maxillary sinus, with mild   to moderate opacities in the ethmoid cavities, and the right maxillary   sinus. 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 The appearance is consistent with   acute on chronic sinusitis, potentially odontogenic in light of the   above. There is hyperdense material within the fluid/mucus, possibly   blood or fungal colonization.    There is straightening of the normal cervical lordosis, with narrowing of   the disc space and moderate bilateral osteophytic foraminal encroachment   at C5-6.    The facial bones are otherwise negative, without evidence of a fracture.   There is no TMJ dislocation. The maxillary, sphenoid, mandible, zygoma,   and nasal bones are negative.  The visualized portions of the frontal and   temporal bones are unremarkable. The walls of the orbits and maxillary   sinuses are intact. The visualized soft tissues of the face and upper   neck are otherwise negative. No incidental lytic or sclerotic lesions.    The globes, lenses, optic nerves, optic sheaths, orbital fat, extraocular   muscles, optic chiasm, suprasellar cistern, sella, and lacrimal glands   are otherwise grossly negative, although a contrast enhanced study would   be more sensitive. The calvarium is within normal limits.      IMPRESSION:    1.  Left temporal lesion with surrounding vasogenic edema.  2.  Recommend MRI with gadolinium to exclude malignancy/infection.  3.  Periapical abscesses with acute on chronic sinusitis.  4.  Correlate clinically to exclude fungal colonization.    Dr. Cheek called report to  Merary Phan  05/25/24 07:34 PM.    Patient Name: MARJORIE PINO  MRN: [id number], Accession: [id number]  DOS: 05/25/24 06:34 PM    --- End of Report ---    < end of copied text >   CC: sinusitis on CT    HPI: 48-year-old Slovenian speaking male no known past medical history on no medications presenting with episode of syncope with shaking witnessed by a coworker.  Pt is daily drinker but did not drink today. He denies any known history of seizure disorder, intracranial abnormality.  Does not follow with a PCP. ENT consulted for CT finding of acute on chronic sinusitis. History obtained with use of  #[id number]. Pt states that he has been feeling tired with fever, cough, nasal congestion and phlegm for about 2 weeks. Pt c/o post nasal drip with bitter taste in the back of his throat. He has tried tylenol and 'tea" with minimal improvement of his symptoms. Pt also c/o coughing up blood this morning which stopped spontaneously. Denies history of sinusitis. Denies N/V, nasal pain, rhinorrhea, facial pain, facial tenderness, headache, or vision changes. Denies dysphagia, odynophagia, dysphonia, SOB, dyspnea, changes in voice or inability to tolerate secretions. Denies dental pain.      PAST MEDICAL & SURGICAL HISTORY:  Alcohol abuse        Allergies    No Known Allergies    Intolerances      MEDICATIONS  (STANDING):    MEDICATIONS  (PRN):  LORazepam     Tablet 2 milliGRAM(s) Oral every 2 hours PRN CIWA-Ar score increase by 2 points and a total score of 7 or less  LORazepam   Injectable 2 milliGRAM(s) IV Push every 1 hour PRN CIWA-Ar score 8 or greater      Social History: unknown    Family history: unknown    ROS:   ENT: all negative except as noted in HPI   CV: denies palpitations  Pulm: denies SOB  GI: denies change in apetite, indigestion, n/v  : denies pertinent urinary symptoms, urgency  Neuro: denies numbness/tingling, loss of sensation  Psych: denies anxiety  MS: denies muscle weakness, instability  Heme: denies easy bruising or bleeding  Endo: denies heat/cold intolerance, excessive sweating  Vascular: denies LE edema    Vital Signs Last 24 Hrs  T(C): 37.1 (25 May 2024 16:05), Max: 37.1 (25 May 2024 14:06)  T(F): 98.8 (25 May 2024 16:05), Max: 98.8 (25 May 2024 16:05)  HR: 79 (25 May 2024 22:13) (79 - 95)  BP: 190/100 (25 May 2024 22:13) (190/100 - 226/113)  BP(mean): 137 (25 May 2024 16:05) (137 - 137)  RR: 20 (25 May 2024 17:23) (18 - 25)  SpO2: 98% (25 May 2024 17:23) (95% - 100%)    Parameters below as of 25 May 2024 17:23  Patient On (Oxygen Delivery Method): room air                              13.6   5.94  )-----------( 130      ( 25 May 2024 14:27 )             40.4    05-25    133<L>  |  91<L>  |  8   ----------------------------<  232<H>  3.0<L>   |  23  |  0.81    Ca    8.6      25 May 2024 14:27  Mg     2.0     05-25    TPro  7.4  /  Alb  4.3  /  TBili  1.3<H>  /  DBili  x   /  AST  61<H>  /  ALT  52<H>  /  AlkPhos  107  05-25   PT/INR - ( 25 May 2024 14:28 )   PT: 11.3 sec;   INR: 1.03 ratio         PTT - ( 25 May 2024 14:28 )  PTT:25.6 sec    PHYSICAL EXAM:  Gen: NAD  Skin: No rashes, bruises, or lesions  Head: Normocephalic, Atraumatic  Face: no edema, erythema, or fluctuance. Parotid glands soft without mass. nontender over frontal and maxillary sinuses.  Eyes: no scleral injection  Nose: b/l anterior nares with dried blood. no active bleeding.  Mouth: No Stridor / Drooling / Trismus.  Mucosa moist, tongue/uvula midline, oropharynx clear. no active bleeding.   Neck: supple, b/l cervical lymphadenopathy, trachea midline.   Resp: breathing easily, no stridor  CV: no peripheral edema/cyanosis  GI: nondistended   Peripheral vascular: no JVD or edema  Neuro: facial nerve intact, no facial droop        Fiberoptic Indirect Nasal Endoscopy  Indication: sinusitis on CT  Anterior rhinoscopy insufficient to account for symptoms  Verbal and/or written consent obtained from patient     b/l posterior middle meatus with minimal purulent discharge. good sensation. no fungal colonization visualized. mucosa pink and moist. No polyps noted. b/l dried blood at anterior nares, no active bleeding.        < from: CT Maxillofacial No Cont (05.25.24 @ 18:34) >  FINDINGS:    A low-density lesion is noted in the left middle temporal gyrus, with   surrounding vasogenic edema. This measures approximately 2.5 x 1.1 x 2.3   cm, although the dimensions of the lesion are somewhat arbitrary due to   its indistinct margins. The appearance is nonspecific and differential   diagnostic considerations would include metastatic disease, abscess,   evolving contusion, and an ischemic event. Further characterization with   an MRI with gadolinium is recommended    Cerebral volume is within normal limits. No premature white matter   disease.    No acute intracranial hemorrhage. No midline shift or herniation. No CT   evidence of acute territorial infarction, although MRI with DWI would be   more sensitive. There is a prominent cisterna magna versus arachnoid cyst   in the left posterior fossa, a common incidental finding.    There are periapical lucencies in the left maxilla involving the roots of   the first right upper premolar and molar. The findings are nonspecific   and differential diagnostic considerations would include periapical   abscess, cyst, granuloma, as well as a variety of other less likely   etiologies. Correlate clinically.    There is moderate fluid and mucus in the left maxillary sinus, with mild   to moderate opacities in the ethmoid cavities, and the right maxillary   sinus. 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 The appearance is consistent with   acute on chronic sinusitis, potentially odontogenic in light of the   above. There is hyperdense material within the fluid/mucus, possibly   blood or fungal colonization.    There is straightening of the normal cervical lordosis, with narrowing of   the disc space and moderate bilateral osteophytic foraminal encroachment   at C5-6.    The facial bones are otherwise negative, without evidence of a fracture.   There is no TMJ dislocation. The maxillary, sphenoid, mandible, zygoma,   and nasal bones are negative.  The visualized portions of the frontal and   temporal bones are unremarkable. The walls of the orbits and maxillary   sinuses are intact. The visualized soft tissues of the face and upper   neck are otherwise negative. No incidental lytic or sclerotic lesions.    The globes, lenses, optic nerves, optic sheaths, orbital fat, extraocular   muscles, optic chiasm, suprasellar cistern, sella, and lacrimal glands   are otherwise grossly negative, although a contrast enhanced study would   be more sensitive. The calvarium is within normal limits.      IMPRESSION:    1.  Left temporal lesion with surrounding vasogenic edema.  2.  Recommend MRI with gadolinium to exclude malignancy/infection.  3.  Periapical abscesses with acute on chronic sinusitis.  4.  Correlate clinically to exclude fungal colonization.    Dr. Cheek called report to  Merary Phan  05/25/24 07:34 PM.    Patient Name: MARJORIE PINO  MRN: [id number], Accession: [id number]  DOS: 05/25/24 06:34 PM    --- End of Report ---    < end of copied text >

## 2024-05-25 NOTE — H&P ADULT - PROBLEM SELECTOR PLAN 3
- CT max/face with periapical lucencies in the left maxilla involving the roots of the first right upper premolar and molar. There is moderate fluid and mucus in the left maxillary sinus, with mild to moderate opacities in the ethmoid cavities, and the right maxillary sinus.  - Scoped by ENT with purulent discharge. No fungus ball. Unable to be reached for culture  - Possible odontogenic source. Dental consult in am  - Check A1C  - Abx as above - CT max/face with periapical lucencies in the left maxilla involving the roots of the first right upper premolar and molar. There is moderate fluid and mucus in the left maxillary sinus, with mild to moderate opacities in the ethmoid cavities, and the right maxillary sinus.  - Scoped by ENT with purulent discharge. No fungus ball. Unable to be reached for culture  - Possible odontogenic source. Dental consult in am  - Check A1C  - Check TTE to eval for endocarditis   - Abx as above - CT max/face with periapical lucencies in the left maxilla involving the roots of the first right upper premolar and molar. There is moderate fluid and mucus in the left maxillary sinus, with mild to moderate opacities in the ethmoid cavities, and the right maxillary sinus.  - Scoped by ENT with purulent discharge. No fungus ball. Unable to be reached for culture  - Possible odontogenic source. Dental consult in am  - Check A1C, lipids  - Check TTE to eval for endocarditis   - Abx as above

## 2024-05-25 NOTE — H&P ADULT - PROBLEM SELECTOR PLAN 7
Diet: Dash, consistent carb  DVT Proph:  Dispo: Pending course Diet: Dash, consistent carb  DVT Proph: lovenox  Dispo: Pending course

## 2024-05-25 NOTE — H&P ADULT - PROBLEM SELECTOR PLAN 5
- Pt with BP's >200>110's in ED. Asymptomatic  - Would start losartan 25mg qd to bring down pressures slowly. Aim for MAP ~110 for next 24 hours

## 2024-05-25 NOTE — H&P ADULT - PROBLEM SELECTOR PLAN 1
- CT H with low-density lesion is noted in the left middle temporal gyrus, with surrounding vasogenic edema. This measures approximately 2.5 x 1.1 x 2.3cm. Differential includes malignancy vs infection vs ischemic event, but given sinus abscesses would treat as a brain abscess  - Vancomycin (5/26 - )  - Ceftriaxone 2g q12 hrs (5/26 - )  - Metronidazole 500 q8 hrs (5/26 - )  - Check MR brain   - Check blood cultures 5/26  - NSX following - CT H with low-density lesion is noted in the left middle temporal gyrus, with surrounding vasogenic edema. This measures approximately 2.5 x 1.1 x 2.3cm. Differential includes malignancy vs infection vs ischemic event, but given sinus abscesses would treat as a brain abscess  - Vancomycin (5/26 - )  - Ceftriaxone 2g q12 hrs (5/26 - )  - Metronidazole 500 q8 hrs (5/26 - )  - Check MR brain  - Check blood cultures (5/26 - )  - NSX following

## 2024-05-25 NOTE — CONSULT NOTE ADULT - PROBLEM SELECTOR RECOMMENDATION 9
PO augmentin  Nasal saline, 2 sprays to B/L nares QID  Follow up at [facility] ENT clinic. call [phone] to make an appointment. [address], [place], [address]. acute on chronic sinusitis, potentially odontogenic  PO augmentin  Nasal saline, 2 sprays to B/L nares QID  Recommend Flonase 1 spray to B/L nares QD x 1 month   dental consult  Follow up at [facility] ENT clinic. call [phone] to make an appointment. [address], [place], [address].

## 2024-05-25 NOTE — ED PROVIDER NOTE - CLINICAL SUMMARY MEDICAL DECISION MAKING FREE TEXT BOX
48-year-old male past medical history may be hypertension presenting with seizure-like activity.  Vital signs on arrival significant for blood pressure of 213/109, heart rate respiratory rate oxygen saturation on room air and temperature otherwise normal.  On physical exam patient has some dried blood coming from his nose and some edema to his nasal bridge.  Otherwise he is awake alert, somewhat oriented does not remember the event today.  His abdomen has no localizing tenderness, clear lungs with regular heart rate, no pedal edema.  EKG significant for LVH with some repolarization which is for abnormalities including discordant T waves in all leads.  Will get a CT head and CT max face, will get a CT abdomen pelvis, chest x-ray, CBC, CMP, VBG, magnesium, urinalysis, urine drug scan and serum drug screen.  At this time uncertain etiology of this first episode seizure however differential includes was not limited to alcohol withdrawal versus new intracranial abnormality including mass versus bleeding, no infection somewhere.  Will hold off on treating his blood pressure as it is likely this high chronically would not want to induce ischemia.

## 2024-05-25 NOTE — H&P ADULT - PROBLEM SELECTOR PLAN 2
- Pt with witnessed fall and seizure like activity. Possible post ictal?  - Withdrawal vs brain lesion related  - Given keppra 1000 IV in ED. Continue with 500mg BID per NSX  - Check EEG

## 2024-05-25 NOTE — ED PROVIDER NOTE - OBJECTIVE STATEMENT
48-year-old male no known past medical history on no medications presenting with episode of syncope with shaking witnessed by a coworker.  Patient reports that he went upstairs overall he has been having malaise for a while, some diffuse abdominal pain.  He denies fever or chills, patient headache, is a daily drinker but did not drink today.  History limited by potential confusion and postictal state, he has some contradictory comments about recent symptoms.  He denies any known history of seizure disorder, intracranial abnormality.  Thinks maybe in the past he was told he had high blood pressure.  Does not follow with a PCP.

## 2024-05-25 NOTE — ED PROVIDER NOTE - PHYSICAL EXAMINATION
GENERAL: Sitting comfortably in bed in no acute distress  NEURO: Alert and Oriented to person, place, somewhat situation. Pupils symmetric, No ptosis. No facial asymmetry or dysarthria, no tremor noted.  HEENT: No conjunctival injection or scleral icterus. dried blood in nareas bilaterally and swelling to nasal bridge  CARD: Normal rate and regular rhythm, no murmurs and no gallops appreciated.  RESP: Clear to auscultation bilaterally, No wheezes, rales or rhonchi. Good respiratory effort.  ABD: Nondistended, Soft and nontender to palpation in all quadrants, no guarding, no rigidity.  EXT: No pedal edema.   SKIN: No rashes, bruising or acute skin injuries on face, limbs, abdomen, chest or back

## 2024-05-25 NOTE — H&P ADULT - NSHPREVIEWOFSYSTEMS_GEN_ALL_CORE
REVIEW OF SYSTEMS:  CONSTITUTIONAL: + intermittent fevers  EYES/ENT: No visual changes;  No vertigo or throat pain   NECK: No pain or stiffness  RESPIRATORY: +cough  CARDIOVASCULAR: No chest pain or palpitations  GASTROINTESTINAL: +abdominal burning  GENITOURINARY: No dysuria, frequency or hematuria  NEUROLOGICAL: No numbness or weakness  SKIN: No itching, rashes

## 2024-05-25 NOTE — ED PROVIDER NOTE - ATTENDING CONTRIBUTION TO CARE
Patient with history of alcohol abuse, here after a witnessed seizure.  According to EMS patient was eating lunch and was witnessed to "shake all over" falling flat onto his face.  Patient has no recollection of this event.  Patient states he has been having fevers for the last few days, and feeling unwell with poor appetite and diffuse abdominal pain that has been constant.  He endorses a cough, but otherwise denies any other symptoms such as headache, trouble breathing, chest pain, vomiting, diarrhea.  Patient denies any previous history of seizures.  Patient unclear on the last time he last drank alcohol initially saying it has been a few days and when calm fronted why he has not been drinking he actually says he drank yesterday.    Differential is broad and includes but not limited to alcohol withdrawal seizure, intracranial injury, systemic infection, dehydration, electrolyte derangement, GI pathology.  We will obtain head and face scans to rule out underlying injury, CT abdomen and pelvis to assess for etiology of his abdominal pain, labs to check for the above, will provide with IV fluids and if he starts showing signs of alcohol withdrawal we will address that as needed.

## 2024-05-25 NOTE — ED PROVIDER NOTE - PROGRESS NOTE DETAILS
Attending MD Mon: CT noted: 1.  Left temporal lesion with surrounding vasogenic edema.  2.  Recommend MRI with gadolinium to exclude malignancy/infection.  3.  Periapical abscesses with acute on chronic sinusitis.  4.  Correlate clinically to exclude fungal colonization.    Neurosurgery at bedside, will await recommendations, will need admission William, PGY2: Patient signed out to me by day team.    Marco Hirsch - 48yM [[id number]]  HTN, DM, fatty liver, alcohol   witnessed seizure, nose bleed, abd pain    Labs remarkable for hypokalemia (3.0), mildly elevated LFTs (AST 61 ALT 52), lactate 5.  CT scans remarkable for left temporal lobe lesion with surrounding vasogenic edema requiring MRI follow-up.  He was also found to have periapical abscesses with acute on chronic sinusitis of the left maxilla.    Neurosurgery consulted.  Neurosurgery will evaluate at bedside.  Under assessment, patient is resting comfortably and is arousable. Attending MD Mon: Reviewed CT findings and need for admission with patient in [place], verbalized understanding, no acute neurosx intervention, recommend MRI while admitted William, PGY2: Patient signed out to me by day team.    Marco Hirsch - 48yM [[id number]]  HTN, DM, fatty liver, alcohol   witnessed seizure, nose bleed, abd pain    Labs remarkable for hypokalemia (3.0), mildly elevated LFTs (AST 61 ALT 52), lactate 5.  CT scans remarkable for left temporal lobe lesion with surrounding vasogenic edema requiring MRI follow-up.  He was also found to have periapical abscesses with acute on chronic sinusitis of the left maxilla.    Neurosurgery consulted.  Neurosurgery will evaluate at bedside.  On assessment, patient is resting comfortably and is arousable. William PGY2: ENT PA consulted ([phone]). William PGY2: ENT recommendations still pending.  Patient is given hydralazine and 10 IV for elevated blood pressures with blood pressure downtrending to 190/100.  Patient has no PCP and has not taken any medications - uncontrolled hypertension.  Patient previously received labetalol 10 mg with minimal improvement of blood pressures.  Admitted to medicine on telemetry.

## 2024-05-25 NOTE — H&P ADULT - NSHPSOCIALHISTORY_GEN_ALL_CORE
From [facility], here for 20 years. Lives alone, has family in [place]. He works in a car wash. He smokes 3 cigarettes daily. Drinks 1 bottle of whiskey? daily. Denies IV drugs.

## 2024-05-25 NOTE — H&P ADULT - NSHPLABSRESULTS_GEN_ALL_CORE
LABS:                         13.6   5.94  )-----------( 130      ( 25 May 2024 14:27 )             40.4         133<L>  |  91<L>  |  8   ----------------------------<  232<H>  3.0<L>   |  23  |  0.81    Ca    8.6      25 May 2024 14:27  Mg     2.0         TPro  7.4  /  Alb  4.3  /  TBili  1.3<H>  /  DBili  x   /  AST  61<H>  /  ALT  52<H>  /  AlkPhos  107      PT/INR - ( 25 May 2024 14:28 )   PT: 11.3 sec;   INR: 1.03 ratio         PTT - ( 25 May 2024 14:28 )  PTT:25.6 sec  Urinalysis Basic - ( 25 May 2024 15:36 )    Color: Yellow / Appearance: Clear / S.009 / pH: x  Gluc: x / Ketone: Trace mg/dL  / Bili: Negative / Urobili: 1.0 mg/dL   Blood: x / Protein: 30 mg/dL / Nitrite: Negative   Leuk Esterase: Negative / RBC: 0 /HPF / WBC 0 /HPF   Sq Epi: x / Non Sq Epi: 0 /HPF / Bacteria: Negative /HPF      RADIOLOGY, EKG & ADDITIONAL TESTS:  < from: CT Abdomen and Pelvis w/ IV Cont (24 @ 18:35) >    IMPRESSION:  No acute intra-abdominal findings.    Hepatic steatosis.    < end of copied text >    < from: CT Maxillofacial No Cont (24 @ 18:34) >    IMPRESSION:    1.  Left temporal lesion with surrounding vasogenic edema.  2.  Recommend MRI with gadolinium to exclude malignancy/infection.  3.  Periapical abscesses with acute on chronic sinusitis.  4.  Correlate clinically to exclude fungal colonization.    < end of copied text >    < from: Xray Chest 1 View AP/PA (24 @ 14:34) >    IMPRESSION:    Clear lungs.    < end of copied text >

## 2024-05-25 NOTE — ED ADULT NURSE NOTE - NS ED NURSE RECORD ANOTHER VITAL SIGN
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 She completed treatment with surgery and three cycles of chemotherapy on 10/16/17. She is here today for follow up and surveillance.    Oncology History:  The patient has had a recent episode of lower abdominal pain in early July.  She was subsequently sent to the emergency room and was found to have a large 9 cm complex ovarian mass. She was admitted to the hospital for pain control.  7/3/17:  1187  17: Exploratory laparotomy, total abdominal hysterectomy, left salpingo-oophorectomy, cancer staging including infracolic omentectomy, bilateral pelvic & para-aortic lymphadenectomy, peritoneal biopsies, evacuation of pelvic fluid by Dr. Cole.    FINAL DIAGNOSIS:   A. LEFT OVARY AND FALLOPIAN TUBE, LEFT SALPINGO-OOPHORECTOMY:   - Ovarian clear cell carcinoma   - Background of endometriosis   - Fallopian tube with no significant histologic abnormality.   B. UTERUS, HYSTERECTOMY:   -  Inactive endometrium   -  Myometrium with adenomyosis and leiomyoma.   -  Cervix with squamous metaplasia.   C. PERITONEUM, RIGHT PELVIS, BIOPSY:   - Fibroadipose tissue, negative for malignancy.   D. LYMPH NODES, RIGHT PELVIC, DISSECTION:   - Thirteen benign lymph nodes (0/13).   E. LYMPH NODES, LEFT PELVIC, DISSECTION:   - Seven benign lymph nodes (0/7).   F. PERITONEUM, LEFT PELVIS, BIOPSY:   - Fibroadipose tissue, negative for malignancy.   G. PERITONEUM, BLADDER, BIOPSY:   - Fibroadipose tissue  with acute and chronic inflammation, negative for malignancy.   H. PERITONEUM, POSTERIOR CUL-DE-SAC, BIOPSY:   - Fibroadipose tissue, negative for malignancy.   I. PERITONEUM, LEFT PARACOLIC GUTTER, BIOPSY:   - Fibroadipose tissue, negative for malignancy.   J. LYMPH NODES, LEFT PARA-AORTIC, DISSECTION:    - Five benign lymph nodes (0/5).   K. LYMPH NODES, RIGHT PARA-AORTIC, DISSECTION:   - Two benign lymph nodes (0/2).   L. PERITONEUM, RIGHT PARACOLIC GUTTER, BIOPSY:   - Fibroadipose tissue, negative for malignancy.   M. OMENTUM, OMENTECTOMY:   - Adipose tissue with reactive changes, negative for malignancy.   COMMENT:   The final diagnosis confirms the interpretation provided intraoperatively.   Report Name: Ovary or Fallopian Tube   Status: Submitted   Part(s) Involved:   A: Ovary and fallopian tube, left   Synoptic Report:   CLINICAL   Clinical History:   - Pelvic mass   SPECIMEN   Procedure:   - Left salpingo-oophorectomy   - Hysterectomy   - Omentectomy   - Peritoneal  biopsies   Lymph Node Sampling:   - Performed   Location:   - Pelvic lymph nodes   - Para-aortic lymph nodes   Specimen Integrity:   - Left ovary   Specimen Integrity of Left Ovary:   - Capsule intact   TUMOR   Primary Tumor Site(s):   - Left ovary   Left Ovary   Tumor Size of Left Ovary: 19 cm   Histologic Type:   - Clear cell carcinoma   Tumor Extent   Ovarian Surface Involvement:   - Absent   Specimen(s)   Extent of Left Ovary:   - Involved   Extent of Left Fallopian Tube:   - Not involved   Extent of Omentum:   - Not involved   Extent of Uterus:   - Not involved   Extent of Peritoneum:   - Not involved   Peritoneal Ascitic Fluid:   - Not performed / unknown   Pleural Fluid:   - Not performed / unknown   LYMPH NODES     Number of Pelvic Lymph Nodes Examined: 20     Number of Pelvic Lymph Nodes Involved: None identified     Number of Para-aortic Lymph Nodes Examined: 7     Number of Para-Aortic Lymph Nodes Involved: None identified   STAGE (PTNM, AJCC 7TH ED.)   Primary Tumor (pT):   - Ovary   Ovarian Primary Tumor (pT):   - pT1a: Tumor limited to 1 ovary; capsule intact, no tumor on ovarian surface. No malignant cells in ascites or peritoneal washings#   Regional Lymph Nodes (pN):   - pN0: No regional lymph node metastasis       07/31/17:   Edmund follow up: Discussed with the patient that given the high risk histology, as well as ruptured mass before surgery, she would be qualified at higher risk of recurrence despite early stage ovarian cancer.  Recommended adjuvant chemotherapy. Plan for carboplatin of AUC of 6 and paclitaxel of 175, m2 for 3 cycles. Referral for genetic counselor and will see her back after those 3 cycles  08/03/17: Call from patient stating she is going for a second opinion and would like chemotherapy rescheduled to week of 8/14/17.      08/16/17: Cycle #1 Carbo/Taxol.  73. Significant paclitaxel reaction.  08/30/17: C1 carboplatin/inpatient paclitaxel desensitization.   09/25/17: C2 carboplatin/paclitaxel- inpatient paclitaxel desensitization.  15.  10/16/17: C3 carboplatin/paclitaxel- switched to docetaxel given her neuropathy.  10.  11/14/17:  8. CT CAP:  IMPRESSION:   1. Post surgical changes of hysterectomy and bilateral  salpingo-oophorectomy. Nodular soft tissue density in the posterior  cul-de-sac along with ill-defined nodular thickening in the left  pelvis, suspicious for residual disease or metastatic deposits.   2. There is a 3 cm mass in the superior pole of the right kidney,  possibly extending into the renal hilum. Represents RCC until proven  otherwise. Consider renal mass protocol CT to assess renal vein  involvement.  3. Stable sub-4 mm pulmonary nodules, continued attention on  follow-up.     11/16/17: CT renal mass protocol  IMPRESSION:  1. Arterially enhancing mass measuring 3.6 x 2.1 x 2.2 cm mass arising  from superior posterior of the right kidney, this is renal cell  carcinoma until proven otherwise.  2. Stable fat-containing umbilical hernia.     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 Margins negative. Three month surveillance plan with [facility].     2/26/18:  14     5/22/18:  11      8/23/18:  11     12/12/18:   10     3/8/19:  12     6/19/19:  13     9/23/19:  12    11/8/19: CT Chest:   IMPRESSION:   1. Stable, sub-4 mm pulmonary nodules as above.  2. Stable hypodense, subcentimeter nodules in the thyroid.     3/27/20:  14    10/1/20:  15    3/29/21:  10    7/18/21 CT A/P for abdominal pain in ER: IMPRESSION:   1.  No acute process in the abdomen or pelvis.  2.  Colonic diverticulosis without evidence for diverticulitis.  3.  Post right nephrectomy, hysterectomy, and oophorectomy    10/5/2021:  13        Today Hafsa comes to the clinic without any gynecological concerns. Pelvic floor PT ongoing, reports urinary incontinence has improved some. Portacath has been clogged for the last four weeks and she would like port removed now. Recently had a lower back cyst opened with general surgery. She is sexually active with her boyfriend. She is on Xarelto daily for hx of DVT. She denies any vaginal bleeding, no changes in her bowel or bladder habits, no nausea/emesis, and no difficulties eating. She denies any abdominal discomfort/bloating, no fevers or chills, and no chest pain or shortness of breath. She up to date on breast screening (high risk screening through [facility]).  She is up to date on her colonoscopy (1/6/20). She has had both COVID vaccines. One episode of abdominal pain in July which she was evaluated for in ER, CT negative. No abdominal pain since ER visit.            ROS: 10 point ROS neg other than the symptoms noted above in the HPI.      Past Medical History:    Past Medical History:   Diagnosis Date     Anxiety state, unspecified     [id number]     Arthritis 2014    vague, gradual, not treated really, knees feel it     At high risk for breast cancer 09/27/2019    30.3% lifetime risk by SHERON model     Attention deficit disorder without mention of hyperactivity      Cancer (H) 7/2017 and 1/2018    ovarian and kidney cancers, both treated well     Chronic rhinitis       Depressive disorder lifetime    plus Anxiety plus ADD. Escitalipram, therapy, ADD meds maybe     Depressive disorder, not elsewhere classified      Heart disease 1999    tachycardia and high cholesterol, managed     History of blood transfusion 7/2017    while in hospital for ovarian cancer     Hypertension 1999    tho BP was too LOW last week. past 12 years Generally OK     Panic disorder without agoraphobia      Pure hypercholesterolemia     Lipitor     Tachycardia      Tachycardia, unspecified     9/1999-2/2004     Type II or unspecified type diabetes mellitus without mention of complication, not stated as uncontrolled     diagnosed 2004         Past Surgical History:    Past Surgical History:   Procedure Laterality Date     ABSCESS DRAINAGE Left     left leg      AS REMV KIDNEY,RADICAL Right      BIOPSY  7/2017 and 2/2018    ovarian and kidney cancer biopsies. also 1987 breast benign     BREAST CYST EXCISION  1985     BREAST SURGERY  1987    date unsure. benign breast cyst removed     CATARACT IOL, RT/LT Left 05/18/2018     CATARACT IOL, RT/LT Right 07/1318     COLONOSCOPY  2008 and 2013    polyps removed. Next due fall 2018     ENDOMETRIAL ABLATION  2008     HYSTERECTOMY TOTAL ABDOMINAL, BILATERAL SALPINGO-OOPHORECTOMY, NODE DISSECTION, COMBINED Left 7/7/2017    Procedure: COMBINED HYSTERECTOMY TOTAL ABDOMINAL, SALPINGO-OOPHORECTOMY, NODE DISSECTION;  Exploratory Laparotomy, Left Salpingo-Oophorectomy, Cancer Staging, Total Hysterectomy, Omentectomy, Evacuation of abdominal fluid, Lymph Node Dissection  Anesthesia Block ;  Surgeon: Serena Cole MD;  Location: [facility] OR     HYSTERECTOMY, PAP NO LONGER INDICATED  07/07/2017    Laparotomy; for ovarian cancer staging     HYSTERECTOMY, PAP NO LONGER INDICATED       INSERT PORT VASCULAR ACCESS Right 8/21/2017    Procedure: INSERT PORT VASCULAR ACCESS;  Single Lumen Chest Power Port;  Surgeon: Stephen Mike PA-C;  Location:  OR      LYMPHADENECTOMY RETROPERITONEAL Bilateral 07/07/2017    Laparotomy; pelvics & para-aortics; for ovarian cancer staging     OMENTECTOMY  07/07/2017    Laparotomy; for ovarian cancer staging     ORTHOPEDIC SURGERY  1/2002    broken left jaw due to fall, 4 fractures, titanium plates     OTHER SURGICAL HISTORY  01/2002    OTHER SURGICAL HISTORYfacial surgery due to fall      PHACOEMULSIFICATION CLEAR CORNEA WITH STANDARD INTRAOCULAR LENS IMPLANT Right 7/13/2018    Procedure: PHACOEMULSIFICATION CLEAR CORNEA WITH STANDARD INTRAOCULAR LENS IMPLANT;  RIght Eye Phacoemulsification Clear Cornea with Standard Intraocular Lens Placement ;  Surgeon: Mary Jo Massey MD;  Location: [facility] OR     PHACOEMULSIFICATION CLEAR CORNEA WITH TORIC INTRAOCULAR LENS IMPLANT Left 5/18/2018    Procedure: PHACOEMULSIFICATION CLEAR CORNEA WITH TORIC INTRAOCULAR LENS IMPLANT;  Left Eye Phacoemulsification with Toric Lens;  Surgeon: Mary Jo Massey MD;  Location: [facility] OR     SALPINGO OOPHORECTOMY,R/L/KAYY Right 2008    Salpingo Oophorectomy, RT     SALPINGO OOPHORECTOMY,R/L/KAYY Left 07/07/2017    Laparotomy; for ovarian cancer staging     SALPINGOOPHORECTOMY  2008     SURGICAL HISTORY OF -       facial surgery d/t fall in 1/2002     SURGICAL HISTORY OF -       1985 removal of breast cyst     SURGICAL HISTORY OF -   2008    endometrial ablation     YAG CAPSULOTOMY OD (RIGHT EYE)  10/22/2018         Health Maintenance Due   Topic Date Due     ZOSTER IMMUNIZATION (1 of 2) Never done     EYE EXAM  11/28/2019     DIABETIC FOOT EXAM  10/01/2020     INFLUENZA VACCINE (1) 09/01/2021       Current Medications:     Current Outpatient Medications   Medication Sig Dispense Refill     Acetaminophen (TYLENOL PO) Take 500 mg by mouth 2 tabs prn pain (monthly)       atorvastatin (LIPITOR) 80 MG tablet TAKE 1 TABLET(80 MG) BY MOUTH DAILY 90 tablet 3     B Complex Vitamins (VITAMIN B-COMPLEX PO) Take 50 mg by mouth        CALCIUM-MAGNESIUM-VITAMIN D PO Take 2 tablets  by mouth daily       cephALEXin (KEFLEX) 500 MG capsule Take 1 capsule (500 mg) by mouth every 6 hours for 7 days 28 capsule 0     Cholecalciferol (VITAMIN D) 1000 UNIT capsule Take 2,000 Units by mouth daily        DiphenhydrAMINE HCl (BENADRYL PO) Take 50 mg by mouth daily as needed (monthly)       EPINEPHrine (EPIPEN/ADRENACLICK/OR ANY BX GENERIC EQUIV) 0.3 MG/0.3ML injection 2-pack Inject 0.3 mLs (0.3 mg) into the muscle once as needed for anaphylaxis 0.3 mL PRN     escitalopram (LEXAPRO) 20 MG tablet TAKE 1 TABLET(20 MG) BY MOUTH DAILY 90 tablet 1     fluticasone (FLONASE) 50 MCG/ACT nasal spray SHAKE LIQUID AND USE 2 SPRAYS IN EACH NOSTRIL DAILY 48 g 3     LORazepam (ATIVAN) 1 MG tablet Take 1 tablet (1 mg) by mouth every 6 hours as needed (nausea/vomiting, anxiety or sleep) 30 tablet 1     Magnesium Hydroxide (MAGNESIA PO) Take 500 mg by mouth       metFORMIN (GLUCOPHAGE-XR) 500 MG 24 hr tablet TAKE 2 TABLETS BY MOUTH EVERY DAY WITH THE EVENING MEAL 180 tablet 1     Multiple Vitamins-Minerals (MULTIVITAMIN ADULT PO) Take 1 tablet by mouth daily       nitroFURantoin macrocrystal-monohydrate (MACROBID) 100 MG capsule TAKE 1 CAPSULE BY MOUTH AFTER INTERCOURSE AS NEEDED 30 capsule PRN     nystatin (MYCOSTATIN) 046480 UNIT/GM external powder Apply topically 2 times daily as needed Apply to folds under breast and abdomen. 60 g 1     Probiotic Product (PRO-BIOTIC BLEND PO) Take 1 capsule by mouth daily Enzymatic Therapy-probiotic pearls       rivaroxaban ANTICOAGULANT (XARELTO) 20 MG TABS tablet Take 1 tablet (20 mg) by mouth daily (with dinner) 90 tablet 4     tamoxifen (NOLVADEX) 10 MG tablet Cut each pill in 1/2 with pill cutter to ensure 5 mg daily dose. 45 tablet 3     traZODone (DESYREL) 50 MG tablet TAKE 1 TABLET  BY MOUTH EVERY NIGHT AS NEEDED FOR SLEEP 180 tablet PRN     fluconazole (DIFLUCAN) 150 MG tablet Take 1 tablet (150 mg) by mouth daily Take additional tablet 72 hours after first if symptoms  persist. (Patient not taking: Reported on 2021) 2 tablet 1         Allergies:        Allergies   Allergen Reactions     Paclitaxel Anaphylaxis and Difficulty breathing     Wasps [Hornets] Anaphylaxis     Yellow Hornet Venom [Hornet Venom] Anaphylaxis and Swelling     Yellow Jacket Venom [Venomil Honey Bee] Anaphylaxis and Swelling     Sulfa Drugs Hives and Rash        Social History:     Social History     Tobacco Use     Smoking status: Never Smoker     Smokeless tobacco: Never Used   Substance Use Topics     Alcohol use: Yes     Comment: Very infrequent, a bit of wine or beer 2x per month maybe       History   Drug Use     Types: Marijuana     Comment: infrequent, for celebrations only, maybe 8x per year         Family History:     The patient's family history is notable for:     Family History   Problem Relation Age of Onset     C.A.D. Father      Diabetes Father      Hypertension Father      Cerebrovascular Disease Father         1984 or      Psychotic Disorder Father      Pancreatic Cancer Father      Coronary Artery Disease Father      Hyperlipidemia Father         treated w statins     Other Cancer Father         pancreatic, ,  of this     Depression Father      Mental Illness Father         likely PTSD and depression     Obesity Father      C.A.D. Mother      Hypertension Mother      Breast Cancer Mother      Psychotic Disorder Mother      Colon Cancer Mother      Cancer Mother         Bone cancer     Coronary Artery Disease Mother      Hyperlipidemia Mother         treated w. statins     Other Cancer Mother         bone/marrow, ,  of this     Depression Mother      Anxiety Disorder Mother      Mental Illness Mother         various undiagnosed types, but THERE     Obesity Mother      Prostate Problems Brother         cleared      Hypertension Brother      Hyperlipidemia Brother         unsure if treated w statins     Depression Brother      Anxiety Disorder Brother      Mental  Illness Brother         undiagnosed but THERE     Obesity Brother      Psychotic Disorder Sister         x2     Neurologic Disorder Sister      Hypertension Sister      Hyperlipidemia Sister         treated w statins     Depression Sister      Anxiety Disorder Sister      Obesity Sister      Psychotic Disorder Brother         x2     Depression Brother         major depressive disorder and OCD     Anxiety Disorder Brother      Mental Illness Brother         not sure of diagnoses, treated     Hypertension Brother      Hyperlipidemia Brother      Psychotic Disorder Maternal Grandmother         ?     Coronary Artery Disease Maternal Grandmother          of heart attack age 84?     Depression Maternal Grandmother      Psychotic Disorder Maternal Grandfather         ?     Psychotic Disorder Paternal Grandmother         Schizophernia     Coronary Artery Disease Paternal Grandmother          of heart attack, age 85?     Depression Paternal Grandmother         severe, but recovered     Mental Illness Paternal Grandmother         possible bipolar or other     Psychotic Disorder Paternal Grandfather         ?     Respiratory Paternal Grandfather          of emphysema and smoking     Psychotic Disorder Sister      Other - See Comments Sister         small kidney stone      Hypertension Sister      Hyperlipidemia Sister         treated w statins     Depression Sister      Anxiety Disorder Sister      Cancer - colorectal No family hx of      Glaucoma No family hx of      Macular Degeneration No family hx of      Bone Cancer Mother      Other - See Comments Mother         psychotic disease      Other - See Comments Father         cerebrovascular disease, psychotic disease      Other - See Comments Sister         psychotic disease      Prostate Cancer Brother      Schizophrenia Paternal Grandmother      Emphysema Paternal Grandfather      Depression Brother      Anxiety Disorder Brother      Other - See Comments Brother  Yes         psychotic disease          Physical Exam:     /82 (BP Location: Left arm, Patient Position: Sitting, Cuff Size: Adult Regular)   Pulse 97   Temp 98.3  F (36.8  C) (Oral)   Wt 95.8 kg (211 lb 4.8 oz)   LMP 01/01/2009   SpO2 95%   BMI 37.43 kg/m    Body mass index is 37.43 kg/m .    General Appearance: healthy and alert, no distress     HEENT: no thyromegaly, no palpable nodules or masses; left supraclavicular swelling known lipomas consistent with prior exams       Cardiovascular: regular rate and rhythm, no gallops, rubs or murmurs     Respiratory: lungs clear, no rales, rhonchi or wheezes    Musculoskeletal: extremities non tender without edema    Skin:  no lesions or rashes; lower left back with dressing applied     Neurological: normal gait, no gross defects     Psychiatric: appropriate mood and affect                               Hematological: normal cervical, supraclavicular and inguinal lymph nodes     Gastrointestinal:       abdomen soft, non-tender, non-distended, no organomegaly or masses    Genitourinary: External genitalia and urethral meatus appears normal. Vagina is smooth without nodularity or masses. Cervix surgically absent.  Bimanual exam reveal no masses, nodularity or fullness. Recto-vaginal exam confirms these findings.       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 She completed treatment with surgery and three cycles of chemotherapy on 10/16/17. She is here today for follow up and surveillance.    A total of 20 minutes was spent with the patient, over 50% minutes of which were spent in counseling the patient and/or treatment planning.      Plan:     1.)         ASHISH on exam. Continue surveillance every six months x3 years (through 10/2022) then annually thereafter with  and pelvic/rectal exam. Continue q6wk port flushes. Reviewed signs and symptoms for when she should contact the clinic or  seek additional care. Patient to contact the clinic with any questions or concerns in the interim. Message sent to ALLYSON Mcadams to contact IR regarding portacath removal and Xarelto holding time frame.      2.) Genetic testing: Hafsa is negative for mutations in the AIP, ALK, APC, COSTA, BAP1, BARD1, BLM, BRCA1, BRCA2, BRIP1, BMPR1A, CDH1, CDK4, CDKN1B, CDKN2A, CHEK2, DICER1, EPCAM, FANCC, FH, FLCN, GALNT12, GREM1, HOXB13, MAX, MEN1, MET, MITF, MLH1, MRE11A, MSH2, MSH6, MUTYH, NBN, NF1, NF2, PALB2, PHOX2B, PMS2, POLD1, POLE, POT1, SOUQJ5E, PTCH1, PTEN, RAD50, RAD51C, RAD51D, RB1, RET, SDHA, SDHAF2, SDHB, SDHC, SDHD, SMAD4, SMARCA4, SMARCB1, SMARCE1, STK11, SUFU, DWVJ461, TP53, TSC1, TSC2, VHL, and XRCC2 genes. No mutations were found in any of the 67 genes analyzed. High risk breast screening. Sees [facility]. On low dose tamoxifen since 10/10/2019 for prevention of breast cancer.     3.) Labs and/or tests ordered include:   (reviewed) Portacath removal; flu shot      4.) Health maintenance issues addressed today include annual health maintenance and non-gynecologic issues with PCP.     5.)        Lung nodules: stable for two years on CT considered statistically benign and no further imaging needed unless new symptoms.     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 Mercado with Urology.Urinary incontinence: stress and urge. Undergoing pelvic floor PT and reports improvements.       MARLON Bradshaw, WHNP-BC  Women's Health Nurse Practitioner  Division of Gynecologic Oncology  [facility]          CC  Patient Care Team:  Morelia Ayon NP as PCP - Karla Perez RN as Continuity Care Coordinator (Gynecologic Oncology)  Nabeel Dick MD as MD (Urology)  Javier Gregorio MD as MD (Urology)  Lola Vasquez, PhD LP as Psychologist (Psychology)  Morelia Ayon NP as Assigned PCP  Leland Mercado MD as MD  (Urology)  Dayanna Clayton, RN as Registered Nurse (Oncology)  Iris Dalton, APRN CNP as Assigned Cancer Care Provider  Leland Mercado MD as Assigned Surgical Provider  Norris Vela MD as MD (Critical Care)  Shawanda Connelly, RN as Registered Nurse

## 2024-05-26 DIAGNOSIS — R94.31 ABNORMAL ELECTROCARDIOGRAM [ECG] [EKG]: ICD-10-CM

## 2024-05-26 DIAGNOSIS — R56.9 UNSPECIFIED CONVULSIONS: ICD-10-CM

## 2024-05-26 LAB
ALBUMIN SERPL ELPH-MCNC: 4.1 G/DL — SIGNIFICANT CHANGE UP (ref 3.3–5)
ALP SERPL-CCNC: 99 U/L — SIGNIFICANT CHANGE UP (ref 40–120)
ALT FLD-CCNC: 47 U/L — HIGH (ref 10–45)
ANION GAP SERPL CALC-SCNC: 12 MMOL/L — SIGNIFICANT CHANGE UP (ref 5–17)
ANION GAP SERPL CALC-SCNC: 13 MMOL/L — SIGNIFICANT CHANGE UP (ref 5–17)
APTT BLD: 27.6 SEC — SIGNIFICANT CHANGE UP (ref 24.5–35.6)
AST SERPL-CCNC: 65 U/L — HIGH (ref 10–40)
BASOPHILS # BLD AUTO: 0.04 K/UL — SIGNIFICANT CHANGE UP (ref 0–0.2)
BASOPHILS NFR BLD AUTO: 0.7 % — SIGNIFICANT CHANGE UP (ref 0–2)
BILIRUB SERPL-MCNC: 1.6 MG/DL — HIGH (ref 0.2–1.2)
BUN SERPL-MCNC: 14 MG/DL — SIGNIFICANT CHANGE UP (ref 7–23)
BUN SERPL-MCNC: 6 MG/DL — LOW (ref 7–23)
CALCIUM SERPL-MCNC: 8.7 MG/DL — SIGNIFICANT CHANGE UP (ref 8.4–10.5)
CALCIUM SERPL-MCNC: 9 MG/DL — SIGNIFICANT CHANGE UP (ref 8.4–10.5)
CHLORIDE SERPL-SCNC: 101 MMOL/L — SIGNIFICANT CHANGE UP (ref 96–108)
CHLORIDE SERPL-SCNC: 96 MMOL/L — SIGNIFICANT CHANGE UP (ref 96–108)
CHOLEST SERPL-MCNC: 278 MG/DL — HIGH
CK SERPL-CCNC: 1675 U/L — HIGH (ref 30–200)
CO2 SERPL-SCNC: 24 MMOL/L — SIGNIFICANT CHANGE UP (ref 22–31)
CO2 SERPL-SCNC: 26 MMOL/L — SIGNIFICANT CHANGE UP (ref 22–31)
CREAT SERPL-MCNC: 0.71 MG/DL — SIGNIFICANT CHANGE UP (ref 0.5–1.3)
CREAT SERPL-MCNC: 0.93 MG/DL — SIGNIFICANT CHANGE UP (ref 0.5–1.3)
EGFR: 100 ML/MIN/1.73M2 — SIGNIFICANT CHANGE UP
EGFR: 112 ML/MIN/1.73M2 — SIGNIFICANT CHANGE UP
EOSINOPHIL # BLD AUTO: 0.07 K/UL — SIGNIFICANT CHANGE UP (ref 0–0.5)
EOSINOPHIL NFR BLD AUTO: 1.2 % — SIGNIFICANT CHANGE UP (ref 0–6)
GLUCOSE SERPL-MCNC: 146 MG/DL — HIGH (ref 70–99)
GLUCOSE SERPL-MCNC: 178 MG/DL — HIGH (ref 70–99)
HAV IGG SER QL IA: REACTIVE
HAV IGM SER-ACNC: SIGNIFICANT CHANGE UP
HBV CORE IGM SER-ACNC: SIGNIFICANT CHANGE UP
HBV SURFACE AB SER-ACNC: SIGNIFICANT CHANGE UP
HBV SURFACE AG SER-ACNC: SIGNIFICANT CHANGE UP
HCT VFR BLD CALC: 40.8 % — SIGNIFICANT CHANGE UP (ref 39–50)
HCV AB S/CO SERPL IA: 0.07 S/CO — SIGNIFICANT CHANGE UP (ref 0–0.99)
HCV AB SERPL-IMP: SIGNIFICANT CHANGE UP
HDLC SERPL-MCNC: 87 MG/DL — SIGNIFICANT CHANGE UP
HGB BLD-MCNC: 13.8 G/DL — SIGNIFICANT CHANGE UP (ref 13–17)
HIV 1+2 AB+HIV1 P24 AG SERPL QL IA: SIGNIFICANT CHANGE UP
HIV 1+2 AB+HIV1 P24 AG SERPL QL IA: SIGNIFICANT CHANGE UP
IMM GRANULOCYTES NFR BLD AUTO: 0.3 % — SIGNIFICANT CHANGE UP (ref 0–0.9)
INR BLD: 1.02 RATIO — SIGNIFICANT CHANGE UP (ref 0.85–1.18)
LIPID PNL WITH DIRECT LDL SERPL: 172 MG/DL — HIGH
LYMPHOCYTES # BLD AUTO: 1.36 K/UL — SIGNIFICANT CHANGE UP (ref 1–3.3)
LYMPHOCYTES # BLD AUTO: 22.9 % — SIGNIFICANT CHANGE UP (ref 13–44)
MAGNESIUM SERPL-MCNC: 2 MG/DL — SIGNIFICANT CHANGE UP (ref 1.6–2.6)
MCHC RBC-ENTMCNC: 28.7 PG — SIGNIFICANT CHANGE UP (ref 27–34)
MCHC RBC-ENTMCNC: 33.8 GM/DL — SIGNIFICANT CHANGE UP (ref 32–36)
MCV RBC AUTO: 84.8 FL — SIGNIFICANT CHANGE UP (ref 80–100)
MONOCYTES # BLD AUTO: 0.53 K/UL — SIGNIFICANT CHANGE UP (ref 0–0.9)
MONOCYTES NFR BLD AUTO: 8.9 % — SIGNIFICANT CHANGE UP (ref 2–14)
MRSA PCR RESULT.: SIGNIFICANT CHANGE UP
NEUTROPHILS # BLD AUTO: 3.93 K/UL — SIGNIFICANT CHANGE UP (ref 1.8–7.4)
NEUTROPHILS NFR BLD AUTO: 66 % — SIGNIFICANT CHANGE UP (ref 43–77)
NON HDL CHOLESTEROL: 191 MG/DL — HIGH
NRBC # BLD: 0 /100 WBCS — SIGNIFICANT CHANGE UP (ref 0–0)
PHOSPHATE SERPL-MCNC: 2.4 MG/DL — LOW (ref 2.5–4.5)
PLATELET # BLD AUTO: 123 K/UL — LOW (ref 150–400)
POTASSIUM SERPL-MCNC: 2.6 MMOL/L — CRITICAL LOW (ref 3.5–5.3)
POTASSIUM SERPL-MCNC: 3.7 MMOL/L — SIGNIFICANT CHANGE UP (ref 3.5–5.3)
POTASSIUM SERPL-SCNC: 2.6 MMOL/L — CRITICAL LOW (ref 3.5–5.3)
POTASSIUM SERPL-SCNC: 3.7 MMOL/L — SIGNIFICANT CHANGE UP (ref 3.5–5.3)
PROT SERPL-MCNC: 6.9 G/DL — SIGNIFICANT CHANGE UP (ref 6–8.3)
PROTHROM AB SERPL-ACNC: 11.2 SEC — SIGNIFICANT CHANGE UP (ref 9.5–13)
RBC # BLD: 4.81 M/UL — SIGNIFICANT CHANGE UP (ref 4.2–5.8)
RBC # FLD: 12.7 % — SIGNIFICANT CHANGE UP (ref 10.3–14.5)
S AUREUS DNA NOSE QL NAA+PROBE: DETECTED
SODIUM SERPL-SCNC: 135 MMOL/L — SIGNIFICANT CHANGE UP (ref 135–145)
SODIUM SERPL-SCNC: 137 MMOL/L — SIGNIFICANT CHANGE UP (ref 135–145)
TRIGL SERPL-MCNC: 110 MG/DL — SIGNIFICANT CHANGE UP
TROPONIN T, HIGH SENSITIVITY RESULT: 38 NG/L — SIGNIFICANT CHANGE UP (ref 0–51)
WBC # BLD: 5.95 K/UL — SIGNIFICANT CHANGE UP (ref 3.8–10.5)
WBC # FLD AUTO: 5.95 K/UL — SIGNIFICANT CHANGE UP (ref 3.8–10.5)

## 2024-05-26 PROCEDURE — 99223 1ST HOSP IP/OBS HIGH 75: CPT

## 2024-05-26 PROCEDURE — 73030 X-RAY EXAM OF SHOULDER: CPT | Mod: 26,RT

## 2024-05-26 PROCEDURE — 99254 IP/OBS CNSLTJ NEW/EST MOD 60: CPT

## 2024-05-26 PROCEDURE — 93010 ELECTROCARDIOGRAM REPORT: CPT

## 2024-05-26 RX ORDER — DEXTROSE 50 % IN WATER 50 %
15 SYRINGE (ML) INTRAVENOUS ONCE
Refills: 0 | Status: DISCONTINUED | OUTPATIENT
Start: 2024-05-26 | End: 2024-05-26

## 2024-05-26 RX ORDER — CEFTRIAXONE 500 MG/1
2000 INJECTION, POWDER, FOR SOLUTION INTRAMUSCULAR; INTRAVENOUS EVERY 12 HOURS
Refills: 0 | Status: DISCONTINUED | OUTPATIENT
Start: 2024-05-26 | End: 2024-05-28

## 2024-05-26 RX ORDER — ENOXAPARIN SODIUM 100 MG/ML
40 INJECTION SUBCUTANEOUS EVERY 24 HOURS
Refills: 0 | Status: DISCONTINUED | OUTPATIENT
Start: 2024-05-26 | End: 2024-05-30

## 2024-05-26 RX ORDER — SODIUM CHLORIDE 0.65 %
2 AEROSOL, SPRAY (ML) NASAL
Refills: 0 | Status: DISCONTINUED | OUTPATIENT
Start: 2024-05-26 | End: 2024-05-30

## 2024-05-26 RX ORDER — THIAMINE MONONITRATE (VIT B1) 100 MG
500 TABLET ORAL EVERY 8 HOURS
Refills: 0 | Status: COMPLETED | OUTPATIENT
Start: 2024-05-26 | End: 2024-05-27

## 2024-05-26 RX ORDER — ATORVASTATIN CALCIUM 80 MG/1
80 TABLET, FILM COATED ORAL AT BEDTIME
Refills: 0 | Status: DISCONTINUED | OUTPATIENT
Start: 2024-05-26 | End: 2024-05-30

## 2024-05-26 RX ORDER — HYDRALAZINE HCL 50 MG
10 TABLET ORAL EVERY 4 HOURS
Refills: 0 | Status: DISCONTINUED | OUTPATIENT
Start: 2024-05-26 | End: 2024-05-27

## 2024-05-26 RX ORDER — VANCOMYCIN HCL 1 G
VIAL (EA) INTRAVENOUS
Refills: 0 | Status: DISCONTINUED | OUTPATIENT
Start: 2024-05-26 | End: 2024-05-28

## 2024-05-26 RX ORDER — LEVETIRACETAM 250 MG/1
500 TABLET, FILM COATED ORAL
Refills: 0 | Status: DISCONTINUED | OUTPATIENT
Start: 2024-05-26 | End: 2024-05-30

## 2024-05-26 RX ORDER — CEFTRIAXONE 500 MG/1
INJECTION, POWDER, FOR SOLUTION INTRAMUSCULAR; INTRAVENOUS
Refills: 0 | Status: DISCONTINUED | OUTPATIENT
Start: 2024-05-26 | End: 2024-05-28

## 2024-05-26 RX ORDER — LOSARTAN POTASSIUM 100 MG/1
25 TABLET, FILM COATED ORAL DAILY
Refills: 0 | Status: DISCONTINUED | OUTPATIENT
Start: 2024-05-26 | End: 2024-05-27

## 2024-05-26 RX ORDER — LIDOCAINE 4 G/100G
1 CREAM TOPICAL EVERY 24 HOURS
Refills: 0 | Status: DISCONTINUED | OUTPATIENT
Start: 2024-05-26 | End: 2024-05-30

## 2024-05-26 RX ORDER — INSULIN LISPRO 100/ML
VIAL (ML) SUBCUTANEOUS AT BEDTIME
Refills: 0 | Status: DISCONTINUED | OUTPATIENT
Start: 2024-05-26 | End: 2024-05-26

## 2024-05-26 RX ORDER — POTASSIUM CHLORIDE 20 MEQ
10 PACKET (EA) ORAL
Refills: 0 | Status: COMPLETED | OUTPATIENT
Start: 2024-05-26 | End: 2024-05-26

## 2024-05-26 RX ORDER — METRONIDAZOLE 500 MG
500 TABLET ORAL EVERY 8 HOURS
Refills: 0 | Status: DISCONTINUED | OUTPATIENT
Start: 2024-05-26 | End: 2024-05-28

## 2024-05-26 RX ORDER — SODIUM CHLORIDE 9 MG/ML
1000 INJECTION, SOLUTION INTRAVENOUS
Refills: 0 | Status: DISCONTINUED | OUTPATIENT
Start: 2024-05-26 | End: 2024-05-26

## 2024-05-26 RX ORDER — DEXTROSE 50 % IN WATER 50 %
12.5 SYRINGE (ML) INTRAVENOUS ONCE
Refills: 0 | Status: DISCONTINUED | OUTPATIENT
Start: 2024-05-26 | End: 2024-05-26

## 2024-05-26 RX ORDER — DEXTROSE 10 % IN WATER 10 %
125 INTRAVENOUS SOLUTION INTRAVENOUS ONCE
Refills: 0 | Status: DISCONTINUED | OUTPATIENT
Start: 2024-05-26 | End: 2024-05-26

## 2024-05-26 RX ORDER — DEXTROSE 50 % IN WATER 50 %
25 SYRINGE (ML) INTRAVENOUS ONCE
Refills: 0 | Status: DISCONTINUED | OUTPATIENT
Start: 2024-05-26 | End: 2024-05-26

## 2024-05-26 RX ORDER — VANCOMYCIN HCL 1 G
1500 VIAL (EA) INTRAVENOUS EVERY 12 HOURS
Refills: 0 | Status: DISCONTINUED | OUTPATIENT
Start: 2024-05-26 | End: 2024-05-28

## 2024-05-26 RX ORDER — POTASSIUM CHLORIDE 20 MEQ
40 PACKET (EA) ORAL EVERY 4 HOURS
Refills: 0 | Status: COMPLETED | OUTPATIENT
Start: 2024-05-26 | End: 2024-05-26

## 2024-05-26 RX ORDER — FOLIC ACID 0.8 MG
1 TABLET ORAL DAILY
Refills: 0 | Status: DISCONTINUED | OUTPATIENT
Start: 2024-05-26 | End: 2024-05-30

## 2024-05-26 RX ORDER — INSULIN LISPRO 100/ML
VIAL (ML) SUBCUTANEOUS
Refills: 0 | Status: DISCONTINUED | OUTPATIENT
Start: 2024-05-26 | End: 2024-05-26

## 2024-05-26 RX ORDER — SODIUM CHLORIDE 9 MG/ML
1000 INJECTION, SOLUTION INTRAVENOUS
Refills: 0 | Status: DISCONTINUED | OUTPATIENT
Start: 2024-05-26 | End: 2024-05-29

## 2024-05-26 RX ORDER — GLUCAGON INJECTION, SOLUTION 0.5 MG/.1ML
1 INJECTION, SOLUTION SUBCUTANEOUS ONCE
Refills: 0 | Status: DISCONTINUED | OUTPATIENT
Start: 2024-05-26 | End: 2024-05-26

## 2024-05-26 RX ORDER — ACETAMINOPHEN 500 MG
650 TABLET ORAL EVERY 6 HOURS
Refills: 0 | Status: DISCONTINUED | OUTPATIENT
Start: 2024-05-26 | End: 2024-05-30

## 2024-05-26 RX ORDER — FLUTICASONE PROPIONATE 50 MCG
1 SPRAY, SUSPENSION NASAL
Refills: 0 | Status: DISCONTINUED | OUTPATIENT
Start: 2024-05-26 | End: 2024-05-30

## 2024-05-26 RX ORDER — CEFTRIAXONE 500 MG/1
2000 INJECTION, POWDER, FOR SOLUTION INTRAMUSCULAR; INTRAVENOUS ONCE
Refills: 0 | Status: COMPLETED | OUTPATIENT
Start: 2024-05-26 | End: 2024-05-26

## 2024-05-26 RX ORDER — VANCOMYCIN HCL 1 G
1500 VIAL (EA) INTRAVENOUS ONCE
Refills: 0 | Status: COMPLETED | OUTPATIENT
Start: 2024-05-26 | End: 2024-05-26

## 2024-05-26 RX ADMIN — Medication 1 MILLIGRAM(S): at 10:41

## 2024-05-26 RX ADMIN — Medication 100 MILLIEQUIVALENT(S): at 08:46

## 2024-05-26 RX ADMIN — Medication 300 MILLIGRAM(S): at 20:25

## 2024-05-26 RX ADMIN — Medication 300 MILLIGRAM(S): at 03:19

## 2024-05-26 RX ADMIN — Medication 105 MILLIGRAM(S): at 03:21

## 2024-05-26 RX ADMIN — Medication 105 MILLIGRAM(S): at 18:35

## 2024-05-26 RX ADMIN — LEVETIRACETAM 500 MILLIGRAM(S): 250 TABLET, FILM COATED ORAL at 18:35

## 2024-05-26 RX ADMIN — Medication 100 MILLIEQUIVALENT(S): at 13:34

## 2024-05-26 RX ADMIN — LEVETIRACETAM 500 MILLIGRAM(S): 250 TABLET, FILM COATED ORAL at 05:16

## 2024-05-26 RX ADMIN — Medication 100 MILLIGRAM(S): at 10:35

## 2024-05-26 RX ADMIN — Medication 105 MILLIGRAM(S): at 13:32

## 2024-05-26 RX ADMIN — Medication 650 MILLIGRAM(S): at 06:42

## 2024-05-26 RX ADMIN — LOSARTAN POTASSIUM 25 MILLIGRAM(S): 100 TABLET, FILM COATED ORAL at 05:16

## 2024-05-26 RX ADMIN — Medication 10 MILLIGRAM(S): at 16:13

## 2024-05-26 RX ADMIN — LIDOCAINE 1 PATCH: 4 CREAM TOPICAL at 10:38

## 2024-05-26 RX ADMIN — Medication 40 MILLIEQUIVALENT(S): at 07:48

## 2024-05-26 RX ADMIN — SODIUM CHLORIDE 75 MILLILITER(S): 9 INJECTION, SOLUTION INTRAVENOUS at 08:46

## 2024-05-26 RX ADMIN — Medication 100 MILLIEQUIVALENT(S): at 10:37

## 2024-05-26 RX ADMIN — Medication 100 MILLIGRAM(S): at 18:34

## 2024-05-26 RX ADMIN — ENOXAPARIN SODIUM 40 MILLIGRAM(S): 100 INJECTION SUBCUTANEOUS at 05:16

## 2024-05-26 RX ADMIN — CEFTRIAXONE 100 MILLIGRAM(S): 500 INJECTION, POWDER, FOR SOLUTION INTRAMUSCULAR; INTRAVENOUS at 01:34

## 2024-05-26 RX ADMIN — LIDOCAINE 1 PATCH: 4 CREAM TOPICAL at 19:38

## 2024-05-26 RX ADMIN — Medication 650 MILLIGRAM(S): at 10:40

## 2024-05-26 RX ADMIN — Medication 2 SPRAY(S): at 18:35

## 2024-05-26 RX ADMIN — Medication 650 MILLIGRAM(S): at 04:08

## 2024-05-26 RX ADMIN — Medication 40 MILLIEQUIVALENT(S): at 13:36

## 2024-05-26 RX ADMIN — ATORVASTATIN CALCIUM 80 MILLIGRAM(S): 80 TABLET, FILM COATED ORAL at 22:39

## 2024-05-26 RX ADMIN — Medication 1 TABLET(S): at 10:41

## 2024-05-26 RX ADMIN — Medication 40 MILLIEQUIVALENT(S): at 10:34

## 2024-05-26 RX ADMIN — CEFTRIAXONE 100 MILLIGRAM(S): 500 INJECTION, POWDER, FOR SOLUTION INTRAMUSCULAR; INTRAVENOUS at 19:43

## 2024-05-26 RX ADMIN — Medication 2 SPRAY(S): at 22:38

## 2024-05-26 RX ADMIN — Medication 1 SPRAY(S): at 13:35

## 2024-05-26 RX ADMIN — Medication 100 MILLIGRAM(S): at 02:17

## 2024-05-26 RX ADMIN — LIDOCAINE 1 PATCH: 4 CREAM TOPICAL at 22:40

## 2024-05-26 NOTE — PROGRESS NOTE ADULT - PROBLEM SELECTOR PLAN 5
- Pt with BP's >200>110's in ED. Asymptomatic  - Start losartan 25mg qd to bring down pressures slowly. Aim for MAP ~110 for next 24 hours

## 2024-05-26 NOTE — PATIENT PROFILE ADULT - FALL HARM RISK - HARM RISK INTERVENTIONS
Assistance with ambulation/Communicate Risk of Fall with Harm to all staff/Monitor gait and stability/Reinforce activity limits and safety measures with patient and family/Reorient to person, place and time as needed/Tailored Fall Risk Interventions/Visual Cue: Yellow wristband and red socks/Bed in lowest position, wheels locked, appropriate side rails in place/Call bell, personal items and telephone in reach/Instruct patient to call for assistance before getting out of bed or chair/Non-slip footwear when patient is out of bed/[place] to call system/Physically safe environment - no spills, clutter or unnecessary equipment/Purposeful Proactive Rounding/Room/bathroom lighting operational, light cord in reach

## 2024-05-26 NOTE — PROGRESS NOTE ADULT - PROBLEM SELECTOR PLAN 4
- Pt drinks 1L of whiskey? nightly before bed. Unclear if he develops withdrawal seizures or DTs  - Last drink 5/24 evening?  - Monitor on CIWA  - Thiamine (high dose), folate, multivitamin

## 2024-05-26 NOTE — PROGRESS NOTE ADULT - PROBLEM SELECTOR PLAN 1
- CT H with low-density lesion is noted in the left middle temporal gyrus, with surrounding vasogenic edema. This measures approximately 2.5 x 1.1 x 2.3cm. Differential includes malignancy vs infection vs ischemic event, but given sinus abscesses would treat as a brain abscess  - Vancomycin (5/26 - )  - Ceftriaxone 2g q12 hrs (5/26 - )  - Metronidazole 500 q8 hrs (5/26 - )  - Ordered MR brain  - F/u blood cultures (5/26 - )  - NSX following  - ID following

## 2024-05-26 NOTE — CONSULT NOTE ADULT - CONSULT REASON
Dental consulted to rule out odontogenic source of bacteremia
sinusitis on CT
Brain mass
temporal lesion

## 2024-05-26 NOTE — CONSULT NOTE ADULT - SUBJECTIVE AND OBJECTIVE BOX
Patient is a 48 Male who presents with a chief complaint of seizure    HPI:  48 M current smoker and EtOH use who presented to the ED after witnessed seizure like activity.     Here CT was done which showed periapical abscesses, acute on chronic sinusitis, and L temporal lobe lesion with surrounding vasogenic edema (infection vs malignancy). Seen by ENT and Neurosurgery. Pending MRI Brain. ID consulted for recommendations.     REVIEW OF SYSTEMS      prior hospital charts reviewed [V]  primary team notes reviewed [V]  other consultant notes reviewed [V]    PAST MEDICAL & SURGICAL HISTORY:  Alcohol abuse    HTN (hypertension)    No significant past surgical history    SOCIAL HISTORY:    FAMILY HISTORY:    Allergies  No Known Allergies    ANTIMICROBIALS:  cefTRIAXone   IVPB    cefTRIAXone   IVPB 2000 every 12 hours  metroNIDAZOLE  IVPB 500 every 8 hours  vancomycin  IVPB 1500 every 12 hours  vancomycin  IVPB      ANTIMICROBIALS (past 90 days):  MEDICATIONS  (STANDING):  cefTRIAXone   IVPB   100 mL/Hr IV Intermittent (05-26-24 @ 01:34)    metroNIDAZOLE  IVPB   100 mL/Hr IV Intermittent (05-26-24 @ 02:17)    vancomycin  IVPB   300 mL/Hr IV Intermittent (05-26-24 @ 03:19)    OTHER MEDS:   MEDICATIONS  (STANDING):  acetaminophen     Tablet .. 650 every 6 hours PRN  enoxaparin Injectable 40 every 24 hours  hydrALAZINE Injectable 10 every 4 hours PRN  levETIRAcetam 500 two times a day  LORazepam     Tablet 2 every 2 hours PRN  LORazepam   Injectable 2 every 1 hour PRN  losartan 25 daily    VITALS:  Vital Signs Last 24 Hrs  T(F): 99.3 (05-26-24 @ 08:02), Max: 99.7 (05-26-24 @ 01:13)    Vital Signs Last 24 Hrs  HR: 61 (05-26-24 @ 08:02) (61 - 95)  BP: 180/93 (05-26-24 @ 08:02) (179/94 - 226/113)  RR: 18 (05-26-24 @ 08:02)  SpO2: 97% (05-26-24 @ 08:02) (95% - 100%)  Wt(kg): --    EXAM:      Labs:                        13.8   5.95  )-----------( 123      ( 26 May 2024 06:36 )             40.8     05-26    135  |  96  |  6<L>  ----------------------------<  178<H>  2.6<LL>   |  26  |  0.71    Ca    8.7      26 May 2024 06:36  Phos  2.4     05-26  Mg     2.0     05-26    TPro  6.9  /  Alb  4.1  /  TBili  1.6<H>  /  DBili  x   /  AST  65<H>  /  ALT  47<H>  /  AlkPhos  99  05-26    WBC Trend:  WBC Count: 5.95 (05-26-24 @ 06:36)  WBC Count: 5.94 (05-25-24 @ 14:27)    Auto Neutrophil #: 3.93 K/uL (05-26-24 @ 06:36)  Auto Neutrophil #: 4.86 K/uL (05-25-24 @ 14:27)    Creatine Trend:  Creatinine: 0.71 (05-26)  Creatinine: 0.81 (05-25)    Liver Biochemical Testing Trend:  Alanine Aminotransferase (ALT/SGPT): 47 *H* (05-26)  Alanine Aminotransferase (ALT/SGPT): 52 *H* (05-25)  Aspartate Aminotransferase (AST/SGOT): 65 (05-26-24 @ 06:36)  Aspartate Aminotransferase (AST/SGOT): 61 (05-25-24 @ 14:27)  Bilirubin Total: 1.6 (05-26)  Bilirubin Total: 1.3 (05-25)    Auto Eosinophil %: 1.2 % (05-26-24 @ 06:36)  Auto Eosinophil %: 0.3 % (05-25-24 @ 14:27)    Urinalysis Basic - ( 26 May 2024 06:36 )    Color: x / Appearance: x / SG: x / pH: x  Gluc: 178 mg/dL / Ketone: x  / Bili: x / Urobili: x   Blood: x / Protein: x / Nitrite: x   Leuk Esterase: x / RBC: x / WBC x   Sq Epi: x / Non Sq Epi: x / Bacteria: x    MICROBIOLOGY:    HIV-1/2 Combo Result: Nonreact (05-25-24 @ 16:06)    Troponin T, High Sensitivity Result: 38 (05-26)  Troponin T, High Sensitivity Result: 28 (05-25)    Blood Gas Venous - Lactate: 1.5 (05-25 @ 17:32)  Blood Gas Venous - Lactate: 5.0 (05-25 @ 14:02)    RADIOLOGY:  imaging below personally reviewed    < from: CT Abdomen and Pelvis w/ IV Cont (05.25.24 @ 18:35) >  IMPRESSION:  No acute intra-abdominal findings.    Hepatic steatosis.    < end of copied text >  < from: CT Head No Cont (05.25.24 @ 18:34) >  IMPRESSION:    1.  Left temporal lesion with surrounding vasogenic edema.  2.  Recommend MRI with gadolinium to exclude malignancy/infection.  3.  Periapical abscesses with acute on chronic sinusitis.  4.  Correlate clinically to exclude fungal colonization.    < end of copied text > Patient is a 48 Male who presents with a chief complaint of seizure    HPI:  48 M current smoker and EtOH use who presented to the ED after witnessed seizure like activity.     Here CT was done which showed periapical abscesses, acute on chronic sinusitis, and L temporal lobe lesion with surrounding vasogenic edema (infection vs malignancy). Seen by ENT and Neurosurgery. Pending MRI Brain. ID consulted for recommendations.     REVIEW OF SYSTEMS  Constitutional: No fevers here, + chills at home per pt   Skin: No rash	  Eyes: No discharge	  ENMT: Denies sore throat   Respiratory: No SOB  Cardiovascular:  No chest pain  Gastrointestinal: No pain  Genitourinary: No dysuria  MSK: No arthralgias   Neurological: No HA     prior hospital charts reviewed [V]  primary team notes reviewed [V]  other consultant notes reviewed [V]    PAST MEDICAL & SURGICAL HISTORY:  Alcohol abuse    HTN (hypertension)    No significant past surgical history    SOCIAL HISTORY:  From [facility]   Moved here 12 years ago  Washes cars     FAMILY HISTORY:  Non contributory     Allergies  No Known Allergies    ANTIMICROBIALS:  cefTRIAXone   IVPB    cefTRIAXone   IVPB 2000 every 12 hours  metroNIDAZOLE  IVPB 500 every 8 hours  vancomycin  IVPB 1500 every 12 hours  vancomycin  IVPB      ANTIMICROBIALS (past 90 days):  MEDICATIONS  (STANDING):  cefTRIAXone   IVPB   100 mL/Hr IV Intermittent (05-26-24 @ 01:34)    metroNIDAZOLE  IVPB   100 mL/Hr IV Intermittent (05-26-24 @ 02:17)    vancomycin  IVPB   300 mL/Hr IV Intermittent (05-26-24 @ 03:19)    OTHER MEDS:   MEDICATIONS  (STANDING):  acetaminophen     Tablet .. 650 every 6 hours PRN  enoxaparin Injectable 40 every 24 hours  hydrALAZINE Injectable 10 every 4 hours PRN  levETIRAcetam 500 two times a day  LORazepam     Tablet 2 every 2 hours PRN  LORazepam   Injectable 2 every 1 hour PRN  losartan 25 daily    VITALS:  Vital Signs Last 24 Hrs  T(F): 99.3 (05-26-24 @ 08:02), Max: 99.7 (05-26-24 @ 01:13)    Vital Signs Last 24 Hrs  HR: 61 (05-26-24 @ 08:02) (61 - 95)  BP: 180/93 (05-26-24 @ 08:02) (179/94 - 226/113)  RR: 18 (05-26-24 @ 08:02)  SpO2: 97% (05-26-24 @ 08:02) (95% - 100%)  Wt(kg): --    EXAM:  General: Patient in NAD  HEENT: NCAT  CV: S1+S2  Lungs: No respiratory distress  Abd: Soft  : No suprapubic tenderness  Neuro: Calm   Ext: No cyanosis  Skin: No rash    Labs:                        13.8   5.95  )-----------( 123      ( 26 May 2024 06:36 )             40.8     05-26    135  |  96  |  6<L>  ----------------------------<  178<H>  2.6<LL>   |  26  |  0.71    Ca    8.7      26 May 2024 06:36  Phos  2.4     05-26  Mg     2.0     05-26    TPro  6.9  /  Alb  4.1  /  TBili  1.6<H>  /  DBili  x   /  AST  65<H>  /  ALT  47<H>  /  AlkPhos  99  05-26    WBC Trend:  WBC Count: 5.95 (05-26-24 @ 06:36)  WBC Count: 5.94 (05-25-24 @ 14:27)    Auto Neutrophil #: 3.93 K/uL (05-26-24 @ 06:36)  Auto Neutrophil #: 4.86 K/uL (05-25-24 @ 14:27)    Creatine Trend:  Creatinine: 0.71 (05-26)  Creatinine: 0.81 (05-25)    Liver Biochemical Testing Trend:  Alanine Aminotransferase (ALT/SGPT): 47 *H* (05-26)  Alanine Aminotransferase (ALT/SGPT): 52 *H* (05-25)  Aspartate Aminotransferase (AST/SGOT): 65 (05-26-24 @ 06:36)  Aspartate Aminotransferase (AST/SGOT): 61 (05-25-24 @ 14:27)  Bilirubin Total: 1.6 (05-26)  Bilirubin Total: 1.3 (05-25)    Auto Eosinophil %: 1.2 % (05-26-24 @ 06:36)  Auto Eosinophil %: 0.3 % (05-25-24 @ 14:27)    Urinalysis Basic - ( 26 May 2024 06:36 )    Color: x / Appearance: x / SG: x / pH: x  Gluc: 178 mg/dL / Ketone: x  / Bili: x / Urobili: x   Blood: x / Protein: x / Nitrite: x   Leuk Esterase: x / RBC: x / WBC x   Sq Epi: x / Non Sq Epi: x / Bacteria: x    MICROBIOLOGY:    HIV-1/2 Combo Result: Nonreact (05-25-24 @ 16:06)    Troponin T, High Sensitivity Result: 38 (05-26)  Troponin T, High Sensitivity Result: 28 (05-25)    Blood Gas Venous - Lactate: 1.5 (05-25 @ 17:32)  Blood Gas Venous - Lactate: 5.0 (05-25 @ 14:02)    RADIOLOGY:  imaging below personally reviewed    < from: CT Abdomen and Pelvis w/ IV Cont (05.25.24 @ 18:35) >  IMPRESSION:  No acute intra-abdominal findings.    Hepatic steatosis.    < end of copied text >  < from: CT Head No Cont (05.25.24 @ 18:34) >  IMPRESSION:    1.  Left temporal lesion with surrounding vasogenic edema.  2.  Recommend MRI with gadolinium to exclude malignancy/infection.  3.  Periapical abscesses with acute on chronic sinusitis.  4.  Correlate clinically to exclude fungal colonization.    < end of copied text >

## 2024-05-26 NOTE — CONSULT NOTE ADULT - ATTENDING COMMENTS
48 M current smoker and EtOH use who presented to the ED after witnessed seizure like activity  No fevers, no leukocytosis  CT A/P no intra-abd infection  CT vasogenic edema, temporal lesion, periapical abscess, acute on chronic sinusitis  F/U BCXs  UA neg  Unclear significance brain lesions; periapical abscess noted  Overall, Seizure, abscess, brain lesion  - Vanco 1500mg q 12 (monitor troughs)  - Ceftriaxone 2g q 12  - Flagyl 500mg q 8  - F/U pending BCXs  - Check TTE  - Follow up MRI  - Dental eval for periapical abscess  - Monitor for focal signs infection    Jose D Bond MD  Contact on TEAMS messaging from 9am - 5pm  From 5pm-9am, on weekends, or if no response call [phone]    I was physically present for the key portions of the evaluation and management service provided. I saw and examined the patient. I agree with the above history, physical, and plan except for any discrepancies which I have documented in “Attending Statements.” Please refer to “Attending Statements” for final plan.

## 2024-05-26 NOTE — CONSULT NOTE ADULT - ASSESSMENT
Incomplete Note    Pt to be seen today  48 M current smoker and EtOH use who presented to the ED after witnessed seizure like activity.     CT showed periapical abscesses, acute on chronic sinusitis, and L temporal lobe lesion with surrounding vasogenic edema (infection vs malignancy)  Seen by ENT and Neurosurgery  Pending MRI Brain    Overall;  Abnormal imaging as above   R/o infection, bacteremia, endocarditis     Suggest;  Agree with empiric abx Vancomycin/CTX/Flagyl for now pending cx data and MRI  F/u blood cultures   Check TTE   HIV screen negative   Monitor fever curve    Prema Busch MD, PGY-5   ID Fellow  [employer] Teams Preferred  After 5pm/weekends call [phone]  48 M current smoker and EtOH use who presented to the ED after witnessed seizure like activity.     CT showed periapical abscesses, acute on chronic sinusitis, and L temporal lobe lesion with surrounding vasogenic edema (infection vs malignancy)  Seen by ENT and Neurosurgery  Pending MRI Brain    Overall;  Abnormal imaging as above   R/o brain abscess, bacteremia, endocarditis     Suggest;  Agree with empiric abx Vancomycin/CTX/Flagyl for now pending cx data and MRI  Check TTE  Dental eval for periapical abscesses   F/u blood cultures    HIV screen negative   Monitor fever curve    rPema Busch MD, PGY-5   ID Fellow  [employer] Teams Preferred  After 5pm/weekends call [phone]

## 2024-05-26 NOTE — PROGRESS NOTE ADULT - SUBJECTIVE AND OBJECTIVE BOX
DATE OF SERVICE: 05-26-24 @ 11:36    Patient is a 48y old  Male who presents with a chief complaint of Seizure, brain mass (26 May 2024 09:06)      SUBJECTIVE / OVERNIGHT EVENTS:  (Roberto 585820) - Patient reports complaints of back pain and right shoulder pain. He is able to remember falling at work and is oriented to self. Patient not oriented to time and not able to state any family members. He also reports having a productive cough with blood tinged sputum. ID and dental consulted for patient. Will continue with vanc, ctx and flagyl. Xray of shoulder ordered. Pending MRI.     MEDICATIONS  (STANDING):  atorvastatin 80 milliGRAM(s) Oral at bedtime  cefTRIAXone   IVPB      cefTRIAXone   IVPB 2000 milliGRAM(s) IV Intermittent every 12 hours  enoxaparin Injectable 40 milliGRAM(s) SubCutaneous every 24 hours  fluticasone propionate 50 MICROgram(s)/spray Nasal Spray 1 Spray(s) Both Nostrils <User Schedule>  folic acid 1 milliGRAM(s) Oral daily  lactated ringers. 1000 milliLiter(s) (75 mL/Hr) IV Continuous <Continuous>  levETIRAcetam 500 milliGRAM(s) Oral two times a day  lidocaine   4% Patch 1 Patch Transdermal every 24 hours  losartan 25 milliGRAM(s) Oral daily  metroNIDAZOLE  IVPB 500 milliGRAM(s) IV Intermittent every 8 hours  multivitamin 1 Tablet(s) Oral daily  potassium chloride    Tablet ER 40 milliEquivalent(s) Oral every 4 hours  potassium chloride  10 mEq/100 mL IVPB 10 milliEquivalent(s) IV Intermittent every 1 hour  sodium chloride 0.65% Nasal 2 Spray(s) Both Nostrils five times a day  thiamine IVPB 500 milliGRAM(s) IV Intermittent every 8 hours  vancomycin  IVPB 1500 milliGRAM(s) IV Intermittent every 12 hours  vancomycin  IVPB        MEDICATIONS  (PRN):  acetaminophen     Tablet .. 650 milliGRAM(s) Oral every 6 hours PRN Mild Pain (1 - 3)  guaiFENesin  milliGRAM(s) Oral every 12 hours PRN Cough  hydrALAZINE Injectable 10 milliGRAM(s) IV Push every 4 hours PRN SBP>200  LORazepam     Tablet 2 milliGRAM(s) Oral every 2 hours PRN CIWA-Ar score increase by 2 points and a total score of 7 or less  LORazepam   Injectable 2 milliGRAM(s) IV Push every 1 hour PRN Symptom-triggered: each CIWA -Ar score 8 or GREATER      Vital Signs Last 24 Hrs  T(C): 37.4 (26 May 2024 08:02), Max: 37.6 (26 May 2024 01:13)  T(F): 99.3 (26 May 2024 08:02), Max: 99.7 (26 May 2024 01:13)  HR: 61 (26 May 2024 08:02) (61 - 95)  BP: 180/93 (26 May 2024 08:02) (179/94 - 226/113)  BP(mean): 137 (25 May 2024 16:05) (137 - 137)  RR: 18 (26 May 2024 08:02) (18 - 25)  SpO2: 97% (26 May 2024 08:02) (95% - 100%)    Parameters below as of 26 May 2024 08:02  Patient On (Oxygen Delivery Method): room air      CAPILLARY BLOOD GLUCOSE      POCT Blood Glucose.: 213 mg/dL (25 May 2024 13:57)    I&O's Summary      PHYSICAL EXAM:  GENERAL: NAD, well-developed  HEAD:  Atraumatic, Normocephalic  EYES: EOMI, PERRLA, conjunctiva and sclera clear  NECK: Supple, No JVD  CHEST/LUNG: Clear to auscultation bilaterally; No wheeze  HEART: Regular rate and rhythm; No murmurs  ABDOMEN: Soft, Nontender, Nondistended  EXTREMITIES:  2+ Peripheral Pulses, No edema  PSYCH: AAOx1-2  NEUROLOGY: non-focal  SKIN: No rashes or lesions      LABS:                        13.8   5.95  )-----------( 123      ( 26 May 2024 06:36 )             40.8     05-26    135  |  96  |  6<L>  ----------------------------<  178<H>  2.6<LL>   |  26  |  0.71    Ca    8.7      26 May 2024 06:36  Phos  2.4     05-26  Mg     2.0     05-26    TPro  6.9  /  Alb  4.1  /  TBili  1.6<H>  /  DBili  x   /  AST  65<H>  /  ALT  47<H>  /  AlkPhos  99  05-26    PT/INR - ( 26 May 2024 06:36 )   PT: 11.2 sec;   INR: 1.02 ratio         PTT - ( 26 May 2024 06:36 )  PTT:27.6 sec  CARDIAC MARKERS ( 26 May 2024 06:36 )  x     / x     / 1675 U/L / x     / x          Urinalysis Basic - ( 26 May 2024 06:36 )    Color: x / Appearance: x / SG: x / pH: x  Gluc: 178 mg/dL / Ketone: x  / Bili: x / Urobili: x   Blood: x / Protein: x / Nitrite: x   Leuk Esterase: x / RBC: x / WBC x   Sq Epi: x / Non Sq Epi: x / Bacteria: x        RADIOLOGY & ADDITIONAL TESTS:    Imaging Personally Reviewed:    Consultant(s) Notes Reviewed:      Care Discussed with Consultants/Other Providers:

## 2024-05-26 NOTE — PATIENT PROFILE ADULT - DO YOU EVER NEED HELP READING HOSPITAL MATERIALS?
Final Anesthesia Post-op Assessment    Patient: Virgil Fierro  Procedure(s) Performed: COLONOSCOPY  Anesthesia type: MAC    Vitals Value Taken Time   Temp 36.4 Â°C (97.6 Â°F) 01/18/23 0738   Pulse 71 01/18/23 0738   Resp 29 01/18/23 0738   SpO2 96 % 01/18/23 0738   /66 01/18/23 0738         Patient Location: PACU Phase 1  Post-op Vital Signs:stable  Level of Consciousness: awake, alert and participates in exam  Respiratory Status: spontaneous ventilation  Cardiovascular stable and blood pressure returned to baseline  Hydration: euvolemic  Pain Management: adequately controlled and well controlled  Handoff: Handoff to receiving clinician was performed and questions were answered  Vomiting: none  Nausea: None  Airway Patency:patent  Post-op Assessment: awake, alert, appropriately conversant, or baseline, no complications, patient tolerated procedure well with no complications, no evidence of recall, dentition within defined limits, moving all extremities and No Corneal Abrasion      No notable events documented. no

## 2024-05-26 NOTE — CONSULT NOTE ADULT - SUBJECTIVE AND OBJECTIVE BOX
TorreyMarco (MRN: [id number]): Patient is a 48y old Male who presents with a chief complaint of seizure, brain mass. Dental consulted to rule out odontogenic source of bacteremia.      used: [id number]     HPI:  The patient is a 48 YOM with no medical history (no PMD), who was brought to the ED after falling down and having seizure like activity. Pt reports 2 weeks of total body fevers (did not check temp). He also reports slight cough, nasal congestion, phlegm. He thinks that he is in the hospital today because of memory loss, which he says started today. Pt also says that he coughed up blood today. Prior to his fall today he reports that he had some rice and then fell. He drinks 1 bottle of brown liquor every night before he goes to bed. When asked if it was whiskey he said yes. He also says that if he doesnt drink he develops belly burning.     Dental HPI: Pt states they have not been to a dentist in a very long time but reports "coughing up blood" for the past few weeks. Pt denies dental or intraoral pain/discomfort.     ED Course:  Vitals: Afebrile, 's/110's, HR 79-95, RR 18-25 99%RA  Labs: CBC with plt 130, CMP with N1 133, K 3.0, Cl 91, slightly elevated LFTs, VBG with lactate 5-->1.5  UA not concerning for infection. Tox screen negative. RVP negative  CT H/max face with left temporal lesion with surrounding vasogenic edema and periapical abscesses with acute on chronic sinusitis.  CT A/P with hepatic steatosis.  Given 0.5L NS, keppra, labetalol and hydralazine Seen by neurosurgery and recommended for medicine admission.     On my exam in the ED, patient is comfortable and in no distress despite his HTN. He is sleeping and asked multiple times for food. Additionally, he is AOx2, does not know the year. Did not seem phased when told he had a brain mass. (25 May 2024 23:22)        PAST MEDICAL & SURGICAL HISTORY:  Alcohol abuse  HTN (hypertension)  No significant past surgical history        MEDICATIONS  (STANDING):  atorvastatin 80 milliGRAM(s) Oral at bedtime  cefTRIAXone   IVPB 2000 milliGRAM(s) IV Intermittent every 12 hours  cefTRIAXone   IVPB      enoxaparin Injectable 40 milliGRAM(s) SubCutaneous every 24 hours  fluticasone propionate 50 MICROgram(s)/spray Nasal Spray 1 Spray(s) Both Nostrils <User Schedule>  folic acid 1 milliGRAM(s) Oral daily  lactated ringers. 1000 milliLiter(s) (75 mL/Hr) IV Continuous <Continuous>  levETIRAcetam 500 milliGRAM(s) Oral two times a day  lidocaine   4% Patch 1 Patch Transdermal every 24 hours  losartan 25 milliGRAM(s) Oral daily  metroNIDAZOLE  IVPB 500 milliGRAM(s) IV Intermittent every 8 hours  multivitamin 1 Tablet(s) Oral daily  potassium chloride    Tablet ER 40 milliEquivalent(s) Oral every 4 hours  potassium chloride  10 mEq/100 mL IVPB 10 milliEquivalent(s) IV Intermittent every 1 hour  sodium chloride 0.65% Nasal 2 Spray(s) Both Nostrils five times a day  thiamine IVPB 500 milliGRAM(s) IV Intermittent every 8 hours  vancomycin  IVPB 1500 milliGRAM(s) IV Intermittent every 12 hours  vancomycin  IVPB         MEDICATIONS  (PRN):  acetaminophen     Tablet .. 650 milliGRAM(s) Oral every 6 hours PRN Mild Pain (1 - 3)  guaiFENesin  milliGRAM(s) Oral every 12 hours PRN Cough  hydrALAZINE Injectable 10 milliGRAM(s) IV Push every 4 hours PRN SBP>200  LORazepam     Tablet 2 milliGRAM(s) Oral every 2 hours PRN CIWA-Ar score increase by 2 points and a total score of 7 or less  LORazepam   Injectable 2 milliGRAM(s) IV Push every 1 hour PRN Symptom-triggered: each CIWA -Ar score 8 or GREATER        Allergies     No Known Allergies     Intolerances        *SOCIAL HISTORY: (guardian or who pt came with), (smoking hx): Pt smokes 2-3 cigarettes/day for ~17 years     *Last Dental Visit: pt does not recall     Vital Signs Last 24 Hrs  T(C): 37.4 (26 May 2024 08:02), Max: 37.6 (26 May 2024 01:13)  T(F): 99.3 (26 May 2024 08:02), Max: 99.7 (26 May 2024 01:13)  HR: 61 (26 May 2024 08:02) (61 - 95)  BP: 180/93 (26 May 2024 08:02) (179/94 - 226/113)  BP(mean): 137 (25 May 2024 16:05) (137 - 137)  RR: 18 (26 May 2024 08:02) (18 - 25)  SpO2: 97% (26 May 2024 08:02) (95% - 100%)     Parameters below as of 26 May 2024 08:02  Patient On (Oxygen Delivery Method): room air        EOE:    Mandible FROM             Facial bones and MOM grossly intact             (-) trismus             (-) LAD             (-) swelling             (-) asymmetry             (-) palpation             (-) SOB             (-) dysphagia             (-) LOC     IOE:  permanent dentition: multiple carious and multiple missing teeth           hard/soft palate:  (-) palatal torus           tongue/FOM: bilateral,~[id number], red, non-tender, exophytic, lesions on lateral borders of the tongue with defined dark red borders. Right lesion appears to have white color changes inferiorly. Left lesion appears to have mixed red/white center. Left FOM inferior to left lesion appears to have a white, non-wipeable lesion as well.           labial/buccal mucosa WNL           (-) percussion           (-) palpation           (-) swelling            (-) mobility     - Missing teeth: #1, #2, #9, #13, #15, #16, #17, #18, #19, #30, #31  - Root tips: #3, #5, #7, #8, #32     - Generalized attrition of mandibular teeth  - Generalized moderate tobacco staining with moderate calculus     Radiographs: Panoramic and PA radiographs taken and interpreted.  - #3 root tip PARL  - #5 root tip PARL  - #32 roots PARL     LABS:                        13.8   5.95  )-----------( 123      ( 26 May 2024 06:36 )             40.8     05-26     135  |  96  |  6<L>  ----------------------------<  178<H>  2.6<LL>   |  26  |  0.71     Ca    8.7      26 May 2024 06:36  Phos  2.4     05-26  Mg     2.0     05-26     TPro  6.9  /  Alb  4.1  /  TBili  1.6<H>  /  DBili  x   /  AST  65<H>  /  ALT  47<H>  /  AlkPhos  99  05-26     WBC Count: 5.95 K/uL [3.80 - 10.50] (05-26 @ 06:36)  WBC Count: 5.94 K/uL [3.80 - 10.50] (05-25 @ 14:27)     Platelet Count - Automated: 123 K/uL *L* [150 - 400] (05-26 @ 06:36)  Platelet Count - Automated: 130 K/uL *L* [150 - 400] (05-25 @ 14:27)     INR: 1.02 ratio [0.85 - 1.18] (05-26 @ 06:36)  INR: 1.03 ratio [0.85 - 1.18] (05-25 @ 14:28)        Urinalysis Basic - ( 26 May 2024 06:36 )     Color: x / Appearance: x / SG: x / pH: x  Gluc: 178 mg/dL / Ketone: x  / Bili: x / Urobili: x   Blood: x / Protein: x / Nitrite: x   Leuk Esterase: x / RBC: x / WBC x   Sq Epi: x / Non Sq Epi: x / Bacteria: x        ASSESSMENT: No signs of acute odontogenic infection noted. No clinical signs of vestibular swelling, abscess, or fistula. Pt denies any dental pain/ intraoral discomfort at this time. PARL on #3, #5, and #32 are likely chronic in nature. It is recommend to consider other possible sources of bacteremia. 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 It is recommend that pt f/u urgently outpatient with [facility] for biopsy [phone].     PROCEDURE: Limited clinical and radiographic exam completed with pt verbal consent. All findings discussed with pt and all questions were answered.     RECOMMENDATIONS:   1) Outpatient consult with [facility] for tongue lesions for possible [phone].  2) Consider other sources of bacteremia.  3) Dental F/U with outpatient dentist for extraction of root tips and comprehensive dental care.    Kelly Diallo DDS #[id number]

## 2024-05-26 NOTE — CONSULT NOTE ADULT - ATTENDING SUPERVISION STATEMENT
Patient Demographics     Address  [address] [place] Phone  [phone] [facility])  [phone] (Mobile) *Preferred* E-mail Address  [email or web address]      Emergency Contact(s)     Name Relation Home Work The Dipesh Ramachandran [phone] Losartan Potassium 50 MG Tabs  Commonly known as:  COZAAR  Next dose due:  08/18/2017 morning      Take by mouth daily.           MetFORMIN HCl 850 MG Tabs  Commonly known as:  GLUCOPHAGE  Next dose due:  08/17/2017 dinner      Take 850 mg by mouth 2 (two) Order ID Medication Name Action Time Action Reason Comments    [id number] Insulin Aspart Pen (NOVOLOG) 100 UNIT/ML flexpen 1-11 Units 08/16/17 1830 Given                    Recent Vital Signs    Flowsheet Row Most Recent Value   Vitals  144/70 Filed at 08/ Calcium, Total 9.2 8.5 - 10.5 mg/dL — [facility]   BUN/CREA Ratio 26.7 10.0 - 20.0 H [facility]   Anion Gap 9 0 - 18 mmol/L — [facility]   Calculated Osmolality 303 275 - 295 mOsm/kg H [facility]   GFR, Non-African American 45 >=60 L [facility] 42.  Chemistry and CBC were unremarkable. Her estimated GFR 46, unknown baseline. White blood cell count of 12.4, hemoglobin of 11.1. The patient had an MR angiogram of the brain/MRI of the brain, which is still pending.   She will be admitted to the [facility] 1.   Left optic neuritis, rule out ischemic etiology versus inflammatory. 2.   Diabetes mellitus type 2.  3.   Mild renal insufficiency. 4.   Hypertension.      The patient will be admitted to general medical floor pending MRI/MRA angiogram of the brain r room that was unremarkable for a new stroke. Additionally, there was no clear swelling on the left optic nerve. The MR angiogram was also unremarkable. PAST MEDICAL HISTORY:  She has a history of hypertension, hyperlipidemia, and type 2 diabetes.   Lewis Prophet Attribution Scott    JW.1 - Genesis Rios MD on 8/16/2017  2:38 AM                     D/C Summary     No notes of this type exist for this encounter.       Physical Therapy Notes (last 72 hours) (Notes from 8/14/2017 11:51 AM through 8/17/2017 11:51 AM) Fellow

## 2024-05-26 NOTE — PATIENT PROFILE ADULT - NSPROSPHOSPCHAPLAINYN_GEN_A_NUR
Subjective:  Patient is seen today for 60-day recertification.  She has severe Alzheimer's dementia.  No current behaviors.  Has hypothyroidism that is treated with Synthroid with normal TSH in March 2021.  She has hypertension and stage III chronic kidney disease.  Blood pressures have been well controlled.  She is on her on for depression and also to help manage weight loss.  Her weight has been stable at 109 pounds.    Patient Active Problem List   Diagnosis     Acquired hypothyroidism     Alzheimer's disease (H)     Atrial fibrillation (H)     Cerebral infarction (H)     Hypertension     Transient cerebral ischemia     Mild protein-calorie malnutrition (H)     Recurrent major depressive disorder, in remission (H)     Acute cystitis without hematuria     Chronic kidney disease, stage 3 (H)     Past Medical History:   Diagnosis Date     Agoraphobia     No Comments Provided     Anxiety disorder     No Comments Provided     Atrial fibrillation (H)     12/28/2012     Autoimmune thyroiditis     No Comments Provided     Bursopathy     4/9/2012     Carcinoma in situ     removed from upper back     Cerebral infarction (H)     12/2/2012,Acute Infarct, posterior left parietal region     Chronic kidney disease, stage III (moderate) (H)     No Comments Provided     Diffuse cystic mastopathy of breast     No Comments Provided     Disorder of cartilage     No Comments Provided     Essential (primary) hypertension     No Comments Provided     Ganglion     6/7/2012     Hypothyroidism     No Comments Provided     Noninfective gastroenteritis and colitis     No Comments Provided     Noninfective gastroenteritis and colitis     Microscopic colitis     Obesity     No Comments Provided     Pneumonitis due to inhalation of food and vomit (H)     1/14/2014     Pure hypercholesterolemia     No Comments Provided     Synovial cyst of popliteal space     9/6/2011     Past Surgical History:   Procedure Laterality Date     ARTHROSCOPY KNEE       09/04,Status post left knee arthroscopy     BIOPSY BREAST      Breast biopsy x 2, both benign     COLONOSCOPY      1999     EXTRACAPSULAR CATARACT EXTRATION WITH INTRAOCULAR LENS IMPLANT      2009,Bilateral cataract surgery     LAPAROSCOPIC TUBAL LIGATION      No Comments Provided     RELEASE CARPAL TUNNEL      2011,Right carpal tunnel release and trigger fingers x 2     Social History     Socioeconomic History     Marital status:      Spouse name: Not on file     Number of children: Not on file     Years of education: Not on file     Highest education level: Not on file   Occupational History     Not on file   Tobacco Use     Smoking status: Never Smoker     Smokeless tobacco: Never Used   Substance and Sexual Activity     Alcohol use: No     Drug use: Unknown     Types: Other     Comment: Drug use: No     Sexual activity: Not on file   Other Topics Concern     Not on file   Social History Narrative    .  Three children.  Retired.    Preload  6/27/2013     Social Determinants of Health     Financial Resource Strain:      Difficulty of Paying Living Expenses:    Food Insecurity:      Worried About Running Out of Food in the Last Year:      Ran Out of Food in the Last Year:    Transportation Needs:      Lack of Transportation (Medical):      Lack of Transportation (Non-Medical):    Physical Activity:      Days of Exercise per Week:      Minutes of Exercise per Session:    Stress:      Feeling of Stress :    Social Connections:      Frequency of Communication with Friends and Family:      Frequency of Social Gatherings with Friends and Family:      Attends Nondenominational Services:      Active Member of Clubs or Organizations:      Attends Club or Organization Meetings:      Marital Status:    Intimate Partner Violence:      Fear of Current or Ex-Partner:      Emotionally Abused:      Physically Abused:      Sexually Abused:      Family History   Problem Relation Age of Onset     Heart Disease Father          Heart Disease     Cancer Sister         Cancer,head/neck     Heart Disease Brother         Heart Disease     Other - See Comments Sister         Irregular heart rhythm     Heart Disease Sister         Heart Disease     Other - See Comments Sister         MVA     Other - See Comments Brother         Multple Sclerosis     Family History Negative Brother         Good Health     Latex, Lisinopril, Penicillins, and Sulfa drugs    Skilled Nursing Facility Medication Record reviewed    End of Life Planning:   DNR/DNI    Review of Systems:  Review of Systems   Constitutional: Negative for diaphoresis and unexpected weight change.   HENT: Positive for trouble swallowing.         All mechanical soft diet   Respiratory: Negative for cough, choking, shortness of breath and wheezing.    Cardiovascular: Negative for chest pain and peripheral edema.   Gastrointestinal: Negative for abdominal distention, abdominal pain, anal bleeding, diarrhea, hematochezia and vomiting.   Genitourinary: Negative for difficulty urinating and hematuria.   Musculoskeletal: Negative for arthralgias.   Skin: Negative for wound.   Neurological: Positive for speech difficulty. Negative for tremors.        No recent falls   Psychiatric/Behavioral: Positive for confusion.       Objective:   /60   Pulse 58   Temp 97  F (36.1  C)   Resp 20   Wt 49.4 kg (109 lb)   SpO2 94%   Physical Exam  Pleasant female no acute distress.  Lying in bed.  Nonverbal.  Severe dementia.  Skin color pink.  Mucous membranes moist.  Neck supple.  No thyromegaly.  Lung fields clear to auscultation.  Cardiovascular irregular, controlled rate.  No S3.  Abdomen soft and without masses, tenderness and organomegaly.  Extremities without edema.    Assessment:    ICD-10-CM    1. Late onset Alzheimer's disease without behavioral disturbance (H)  G30.1     F02.80    2. Acquired hypothyroidism  E03.9    3. Permanent atrial fibrillation (H)  I48.21    4. Essential hypertension   I10    5. Stage 3a chronic kidney disease (H)  N18.31    6. Recurrent major depressive disorder, in remission (H)  F33.40    7. Advance care planning  Z71.89 No CPR- Do NOT Intubate       Plan:   Patient's dementia has been stable.  Continue to follow with psychiatry.  Continue with same medication and treatment plan as has stable hypertension and stage III chronic kidney disease.  Last thyroid labs normal.  [facility] reviewed today and updated in Epic.    NATHALIE Ashton   10/25/2021  2:50 PM   no

## 2024-05-26 NOTE — PATIENT PROFILE ADULT - STATED REASON FOR ADMISSION
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

## 2024-05-27 DIAGNOSIS — K14.8 OTHER DISEASES OF TONGUE: ICD-10-CM

## 2024-05-27 LAB
24R-OH-CALCIDIOL SERPL-MCNC: 11 NG/ML — LOW (ref 30–80)
A1C WITH ESTIMATED AVERAGE GLUCOSE RESULT: 5.9 % — HIGH (ref 4–5.6)
ALBUMIN SERPL ELPH-MCNC: 3.8 G/DL — SIGNIFICANT CHANGE UP (ref 3.3–5)
ALP SERPL-CCNC: 87 U/L — SIGNIFICANT CHANGE UP (ref 40–120)
ALT FLD-CCNC: 41 U/L — SIGNIFICANT CHANGE UP (ref 10–45)
ANION GAP SERPL CALC-SCNC: 12 MMOL/L — SIGNIFICANT CHANGE UP (ref 5–17)
AST SERPL-CCNC: 47 U/L — HIGH (ref 10–40)
BASOPHILS # BLD AUTO: 0.04 K/UL — SIGNIFICANT CHANGE UP (ref 0–0.2)
BASOPHILS NFR BLD AUTO: 0.8 % — SIGNIFICANT CHANGE UP (ref 0–2)
BILIRUB SERPL-MCNC: 1.3 MG/DL — HIGH (ref 0.2–1.2)
BUN SERPL-MCNC: 9 MG/DL — SIGNIFICANT CHANGE UP (ref 7–23)
CALCIUM SERPL-MCNC: 8.7 MG/DL — SIGNIFICANT CHANGE UP (ref 8.4–10.5)
CHLORIDE SERPL-SCNC: 104 MMOL/L — SIGNIFICANT CHANGE UP (ref 96–108)
CK SERPL-CCNC: 784 U/L — HIGH (ref 30–200)
CO2 SERPL-SCNC: 23 MMOL/L — SIGNIFICANT CHANGE UP (ref 22–31)
CREAT SERPL-MCNC: 0.88 MG/DL — SIGNIFICANT CHANGE UP (ref 0.5–1.3)
EGFR: 105 ML/MIN/1.73M2 — SIGNIFICANT CHANGE UP
EOSINOPHIL # BLD AUTO: 0.28 K/UL — SIGNIFICANT CHANGE UP (ref 0–0.5)
EOSINOPHIL NFR BLD AUTO: 5.8 % — SIGNIFICANT CHANGE UP (ref 0–6)
ESTIMATED AVERAGE GLUCOSE: 123 MG/DL — HIGH (ref 68–114)
FOLATE SERPL-MCNC: 5.5 NG/ML — SIGNIFICANT CHANGE UP
GLUCOSE SERPL-MCNC: 112 MG/DL — HIGH (ref 70–99)
HCT VFR BLD CALC: 40.5 % — SIGNIFICANT CHANGE UP (ref 39–50)
HGB BLD-MCNC: 13.5 G/DL — SIGNIFICANT CHANGE UP (ref 13–17)
IMM GRANULOCYTES NFR BLD AUTO: 0.4 % — SIGNIFICANT CHANGE UP (ref 0–0.9)
LYMPHOCYTES # BLD AUTO: 0.97 K/UL — LOW (ref 1–3.3)
LYMPHOCYTES # BLD AUTO: 20 % — SIGNIFICANT CHANGE UP (ref 13–44)
MAGNESIUM SERPL-MCNC: 2 MG/DL — SIGNIFICANT CHANGE UP (ref 1.6–2.6)
MCHC RBC-ENTMCNC: 29.5 PG — SIGNIFICANT CHANGE UP (ref 27–34)
MCHC RBC-ENTMCNC: 33.3 GM/DL — SIGNIFICANT CHANGE UP (ref 32–36)
MCV RBC AUTO: 88.4 FL — SIGNIFICANT CHANGE UP (ref 80–100)
MONOCYTES # BLD AUTO: 0.48 K/UL — SIGNIFICANT CHANGE UP (ref 0–0.9)
MONOCYTES NFR BLD AUTO: 9.9 % — SIGNIFICANT CHANGE UP (ref 2–14)
NEUTROPHILS # BLD AUTO: 3.06 K/UL — SIGNIFICANT CHANGE UP (ref 1.8–7.4)
NEUTROPHILS NFR BLD AUTO: 63.1 % — SIGNIFICANT CHANGE UP (ref 43–77)
NRBC # BLD: 0 /100 WBCS — SIGNIFICANT CHANGE UP (ref 0–0)
PHOSPHATE SERPL-MCNC: 2.8 MG/DL — SIGNIFICANT CHANGE UP (ref 2.5–4.5)
PLATELET # BLD AUTO: 115 K/UL — LOW (ref 150–400)
POTASSIUM SERPL-MCNC: 3.7 MMOL/L — SIGNIFICANT CHANGE UP (ref 3.5–5.3)
POTASSIUM SERPL-SCNC: 3.7 MMOL/L — SIGNIFICANT CHANGE UP (ref 3.5–5.3)
PROT SERPL-MCNC: 5.9 G/DL — LOW (ref 6–8.3)
RBC # BLD: 4.58 M/UL — SIGNIFICANT CHANGE UP (ref 4.2–5.8)
RBC # FLD: 12.9 % — SIGNIFICANT CHANGE UP (ref 10.3–14.5)
SODIUM SERPL-SCNC: 139 MMOL/L — SIGNIFICANT CHANGE UP (ref 135–145)
WBC # BLD: 4.85 K/UL — SIGNIFICANT CHANGE UP (ref 3.8–10.5)
WBC # FLD AUTO: 4.85 K/UL — SIGNIFICANT CHANGE UP (ref 3.8–10.5)

## 2024-05-27 PROCEDURE — 99233 SBSQ HOSP IP/OBS HIGH 50: CPT | Mod: GC

## 2024-05-27 PROCEDURE — 70553 MRI BRAIN STEM W/O & W/DYE: CPT | Mod: 26

## 2024-05-27 PROCEDURE — 99232 SBSQ HOSP IP/OBS MODERATE 35: CPT

## 2024-05-27 RX ORDER — LOSARTAN POTASSIUM 100 MG/1
50 TABLET, FILM COATED ORAL DAILY
Refills: 0 | Status: DISCONTINUED | OUTPATIENT
Start: 2024-05-28 | End: 2024-05-28

## 2024-05-27 RX ORDER — CHOLECALCIFEROL (VITAMIN D3) 125 MCG
1000 CAPSULE ORAL DAILY
Refills: 0 | Status: DISCONTINUED | OUTPATIENT
Start: 2024-05-27 | End: 2024-05-30

## 2024-05-27 RX ORDER — HYDRALAZINE HCL 50 MG
10 TABLET ORAL EVERY 4 HOURS
Refills: 0 | Status: DISCONTINUED | OUTPATIENT
Start: 2024-05-27 | End: 2024-05-30

## 2024-05-27 RX ORDER — LABETALOL HCL 100 MG
10 TABLET ORAL ONCE
Refills: 0 | Status: COMPLETED | OUTPATIENT
Start: 2024-05-27 | End: 2024-05-27

## 2024-05-27 RX ORDER — LOSARTAN POTASSIUM 100 MG/1
25 TABLET, FILM COATED ORAL ONCE
Refills: 0 | Status: COMPLETED | OUTPATIENT
Start: 2024-05-27 | End: 2024-05-27

## 2024-05-27 RX ADMIN — ENOXAPARIN SODIUM 40 MILLIGRAM(S): 100 INJECTION SUBCUTANEOUS at 05:15

## 2024-05-27 RX ADMIN — CEFTRIAXONE 100 MILLIGRAM(S): 500 INJECTION, POWDER, FOR SOLUTION INTRAMUSCULAR; INTRAVENOUS at 05:15

## 2024-05-27 RX ADMIN — Medication 10 MILLIGRAM(S): at 11:32

## 2024-05-27 RX ADMIN — Medication 1 SPRAY(S): at 11:37

## 2024-05-27 RX ADMIN — Medication 2 SPRAY(S): at 02:02

## 2024-05-27 RX ADMIN — Medication 105 MILLIGRAM(S): at 16:04

## 2024-05-27 RX ADMIN — LOSARTAN POTASSIUM 25 MILLIGRAM(S): 100 TABLET, FILM COATED ORAL at 05:15

## 2024-05-27 RX ADMIN — LEVETIRACETAM 500 MILLIGRAM(S): 250 TABLET, FILM COATED ORAL at 17:11

## 2024-05-27 RX ADMIN — Medication 100 MILLIGRAM(S): at 16:04

## 2024-05-27 RX ADMIN — Medication 1 MILLIGRAM(S): at 11:37

## 2024-05-27 RX ADMIN — Medication 300 MILLIGRAM(S): at 07:39

## 2024-05-27 RX ADMIN — ATORVASTATIN CALCIUM 80 MILLIGRAM(S): 80 TABLET, FILM COATED ORAL at 22:20

## 2024-05-27 RX ADMIN — Medication 2 SPRAY(S): at 22:18

## 2024-05-27 RX ADMIN — Medication 1000 UNIT(S): at 11:37

## 2024-05-27 RX ADMIN — Medication 10 MILLIGRAM(S): at 17:11

## 2024-05-27 RX ADMIN — Medication 2 SPRAY(S): at 16:22

## 2024-05-27 RX ADMIN — Medication 100 MILLIGRAM(S): at 09:55

## 2024-05-27 RX ADMIN — CEFTRIAXONE 100 MILLIGRAM(S): 500 INJECTION, POWDER, FOR SOLUTION INTRAMUSCULAR; INTRAVENOUS at 17:12

## 2024-05-27 RX ADMIN — Medication 300 MILLIGRAM(S): at 17:12

## 2024-05-27 RX ADMIN — Medication 100 MILLIGRAM(S): at 02:02

## 2024-05-27 RX ADMIN — Medication 10 MILLIGRAM(S): at 18:40

## 2024-05-27 RX ADMIN — Medication 1 TABLET(S): at 11:37

## 2024-05-27 RX ADMIN — Medication 2 SPRAY(S): at 11:32

## 2024-05-27 RX ADMIN — LEVETIRACETAM 500 MILLIGRAM(S): 250 TABLET, FILM COATED ORAL at 05:13

## 2024-05-27 RX ADMIN — Medication 105 MILLIGRAM(S): at 00:15

## 2024-05-27 RX ADMIN — Medication 100 MILLIGRAM(S): at 17:12

## 2024-05-27 RX ADMIN — Medication 2 SPRAY(S): at 07:40

## 2024-05-27 RX ADMIN — Medication 100 MILLIGRAM(S): at 22:20

## 2024-05-27 RX ADMIN — LOSARTAN POTASSIUM 25 MILLIGRAM(S): 100 TABLET, FILM COATED ORAL at 13:22

## 2024-05-27 RX ADMIN — Medication 105 MILLIGRAM(S): at 08:42

## 2024-05-27 NOTE — PROGRESS NOTE ADULT - ASSESSMENT
48 M current smoker and EtOH use who presented to the ED after witnessed seizure like activity  No fevers, no leukocytosis  CT A/P no intra-abd infection  CT vasogenic edema, temporal lesion, periapical abscess, acute on chronic sinusitis  F/U BCXs  UA neg  Unclear significance brain lesions; periapical abscess noted  Overall, Seizure, abscess, brain lesion  - Vanco 1500mg q 12 (monitor troughs)  - Ceftriaxone 2g q 12  - Flagyl 500mg q 8  - F/U pending BCXs  - Check TTE  - Follow up MRI  - Note Dental eval  - Monitor for focal signs infection    Jose D Bond MD  Contact on TEAMS messaging from 9am - 5pm  From 5pm-9am, on weekends, or if no response call [phone]

## 2024-05-27 NOTE — PROGRESS NOTE ADULT - PROBLEM SELECTOR PLAN 7
Diet: Dash, consistent carb  DVT Proph: lovenox  Dispo: Pending course - , likely ISO hypokalemia  - avoid QT prolonging agents

## 2024-05-27 NOTE — PROGRESS NOTE ADULT - PROBLEM SELECTOR PLAN 6
- , likely ISO hypokalemia  - avoid QT prolonging agents - Pt with BP's >200>110's in ED. Asymptomatic  - Start losartan 25mg qd to bring down pressures slowly. Increase to 50mg qdaily (5/28 -   - PRN hydralazine for Systolic >180

## 2024-05-27 NOTE — PROGRESS NOTE ADULT - SUBJECTIVE AND OBJECTIVE BOX
CC: F/U for Brain lesion    Saw/spoke to patient. No new events. Generally well appearing.    Allergies  No Known Allergies    ANTIMICROBIALS:  cefTRIAXone   IVPB 2000 every 12 hours  cefTRIAXone   IVPB    metroNIDAZOLE  IVPB 500 every 8 hours  vancomycin  IVPB 1500 every 12 hours  vancomycin  IVPB      PE:    Vital Signs Last 24 Hrs  T(C): 37.1 (27 May 2024 04:00), Max: 37.5 (27 May 2024 00:00)  T(F): 98.8 (27 May 2024 04:00), Max: 99.5 (27 May 2024 00:00)  HR: 73 (27 May 2024 04:00) (69 - 83)  BP: 180/97 (27 May 2024 05:23) (148/78 - 201/111)  RR: 18 (27 May 2024 04:00) (18 - 18)  SpO2: 98% (27 May 2024 04:00) (97% - 98%)    Gen: AOx3, NAD, non-toxic  CV: Nontachycardic  Resp: Breathing comfortably, RA  Abd: Soft, nontender  IV/Skin: No thrombophlebitis    LABS:                        13.5   4.85  )-----------( 115      ( 27 May 2024 06:58 )             40.5     05-27    139  |  104  |  9   ----------------------------<  112<H>  3.7   |  23  |  0.88    Ca    8.7      27 May 2024 06:59  Phos  2.8     05-27  Mg     2.0     05-27    TPro  5.9<L>  /  Alb  3.8  /  TBili  1.3<H>  /  DBili  x   /  AST  47<H>  /  ALT  41  /  AlkPhos  87  05-27    Urinalysis Basic - ( 27 May 2024 06:59 )    Color: x / Appearance: x / SG: x / pH: x  Gluc: 112 mg/dL / Ketone: x  / Bili: x / Urobili: x   Blood: x / Protein: x / Nitrite: x   Leuk Esterase: x / RBC: x / WBC x   Sq Epi: x / Non Sq Epi: x / Bacteria: x    MICROBIOLOGY:    .Blood Blood-Peripheral  05-26-24   No growth at 24 hours  --  --    RADIOLOGY:    5/25 CT    IMPRESSION:  No acute intra-abdominal findings.    Hepatic steatosis.
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
## 2024-05-27 NOTE — PROGRESS NOTE ADULT - ATTENDING COMMENTS
-Denies HA or tremor or pain.   -Pending MRI brain with contrast, EEG, echo. -C/w broad abx per ID recs. -S/p NSGY, ENT, dental evals.   -Increase losartan to 50mg daily for better BP control.   -Needs at least outpt dental f/u for lesions on tongue and full dental care.   -Consider sending cysticercosis Ab.  -D/w housestaff.

## 2024-05-27 NOTE — CHART NOTE - NSCHARTNOTEFT_GEN_A_CORE
MR done w/ no evidence of underlying lesion.  -    given long stability scan, OK for dvt ppx   -  f/u w/ Dr. Moe outpt 1-2 wks from d/c

## 2024-05-28 LAB
ALBUMIN SERPL ELPH-MCNC: 3.7 G/DL — SIGNIFICANT CHANGE UP (ref 3.3–5)
ALP SERPL-CCNC: 86 U/L — SIGNIFICANT CHANGE UP (ref 40–120)
ALT FLD-CCNC: 46 U/L — HIGH (ref 10–45)
ANION GAP SERPL CALC-SCNC: 10 MMOL/L — SIGNIFICANT CHANGE UP (ref 5–17)
AST SERPL-CCNC: 46 U/L — HIGH (ref 10–40)
BASOPHILS # BLD AUTO: 0.03 K/UL — SIGNIFICANT CHANGE UP (ref 0–0.2)
BASOPHILS NFR BLD AUTO: 0.5 % — SIGNIFICANT CHANGE UP (ref 0–2)
BILIRUB SERPL-MCNC: 0.8 MG/DL — SIGNIFICANT CHANGE UP (ref 0.2–1.2)
BUN SERPL-MCNC: 9 MG/DL — SIGNIFICANT CHANGE UP (ref 7–23)
CALCIUM SERPL-MCNC: 8.8 MG/DL — SIGNIFICANT CHANGE UP (ref 8.4–10.5)
CHLORIDE SERPL-SCNC: 105 MMOL/L — SIGNIFICANT CHANGE UP (ref 96–108)
CK SERPL-CCNC: 457 U/L — HIGH (ref 30–200)
CO2 SERPL-SCNC: 25 MMOL/L — SIGNIFICANT CHANGE UP (ref 22–31)
CREAT SERPL-MCNC: 0.86 MG/DL — SIGNIFICANT CHANGE UP (ref 0.5–1.3)
EGFR: 105 ML/MIN/1.73M2 — SIGNIFICANT CHANGE UP
EOSINOPHIL # BLD AUTO: 0.28 K/UL — SIGNIFICANT CHANGE UP (ref 0–0.5)
EOSINOPHIL NFR BLD AUTO: 5 % — SIGNIFICANT CHANGE UP (ref 0–6)
GLUCOSE SERPL-MCNC: 109 MG/DL — HIGH (ref 70–99)
HBV CORE AB SER-ACNC: SIGNIFICANT CHANGE UP
HCT VFR BLD CALC: 36.6 % — LOW (ref 39–50)
HGB BLD-MCNC: 12.6 G/DL — LOW (ref 13–17)
IMM GRANULOCYTES NFR BLD AUTO: 0.4 % — SIGNIFICANT CHANGE UP (ref 0–0.9)
LYMPHOCYTES # BLD AUTO: 1.28 K/UL — SIGNIFICANT CHANGE UP (ref 1–3.3)
LYMPHOCYTES # BLD AUTO: 23 % — SIGNIFICANT CHANGE UP (ref 13–44)
MAGNESIUM SERPL-MCNC: 2 MG/DL — SIGNIFICANT CHANGE UP (ref 1.6–2.6)
MCHC RBC-ENTMCNC: 29.8 PG — SIGNIFICANT CHANGE UP (ref 27–34)
MCHC RBC-ENTMCNC: 34.4 GM/DL — SIGNIFICANT CHANGE UP (ref 32–36)
MCV RBC AUTO: 86.5 FL — SIGNIFICANT CHANGE UP (ref 80–100)
MONOCYTES # BLD AUTO: 0.5 K/UL — SIGNIFICANT CHANGE UP (ref 0–0.9)
MONOCYTES NFR BLD AUTO: 9 % — SIGNIFICANT CHANGE UP (ref 2–14)
NEUTROPHILS # BLD AUTO: 3.46 K/UL — SIGNIFICANT CHANGE UP (ref 1.8–7.4)
NEUTROPHILS NFR BLD AUTO: 62.1 % — SIGNIFICANT CHANGE UP (ref 43–77)
NRBC # BLD: 0 /100 WBCS — SIGNIFICANT CHANGE UP (ref 0–0)
PHOSPHATE SERPL-MCNC: 4.3 MG/DL — SIGNIFICANT CHANGE UP (ref 2.5–4.5)
PLATELET # BLD AUTO: 108 K/UL — LOW (ref 150–400)
POTASSIUM SERPL-MCNC: 3.7 MMOL/L — SIGNIFICANT CHANGE UP (ref 3.5–5.3)
POTASSIUM SERPL-SCNC: 3.7 MMOL/L — SIGNIFICANT CHANGE UP (ref 3.5–5.3)
PROT SERPL-MCNC: 5.9 G/DL — LOW (ref 6–8.3)
RBC # BLD: 4.23 M/UL — SIGNIFICANT CHANGE UP (ref 4.2–5.8)
RBC # FLD: 12.6 % — SIGNIFICANT CHANGE UP (ref 10.3–14.5)
SODIUM SERPL-SCNC: 140 MMOL/L — SIGNIFICANT CHANGE UP (ref 135–145)
VANCOMYCIN TROUGH SERPL-MCNC: 12 UG/ML — SIGNIFICANT CHANGE UP (ref 10–20)
WBC # BLD: 5.57 K/UL — SIGNIFICANT CHANGE UP (ref 3.8–10.5)
WBC # FLD AUTO: 5.57 K/UL — SIGNIFICANT CHANGE UP (ref 3.8–10.5)

## 2024-05-28 PROCEDURE — 95720 EEG PHY/QHP EA INCR W/VEEG: CPT

## 2024-05-28 PROCEDURE — 99233 SBSQ HOSP IP/OBS HIGH 50: CPT | Mod: GC

## 2024-05-28 PROCEDURE — 99232 SBSQ HOSP IP/OBS MODERATE 35: CPT

## 2024-05-28 RX ORDER — LOSARTAN POTASSIUM 100 MG/1
50 TABLET, FILM COATED ORAL DAILY
Refills: 0 | Status: DISCONTINUED | OUTPATIENT
Start: 2024-05-28 | End: 2024-05-28

## 2024-05-28 RX ORDER — CHLORHEXIDINE GLUCONATE 213 G/1000ML
1 SOLUTION TOPICAL DAILY
Refills: 0 | Status: DISCONTINUED | OUTPATIENT
Start: 2024-05-28 | End: 2024-05-30

## 2024-05-28 RX ORDER — NIFEDIPINE 30 MG
30 TABLET, EXTENDED RELEASE 24 HR ORAL DAILY
Refills: 0 | Status: DISCONTINUED | OUTPATIENT
Start: 2024-05-28 | End: 2024-05-29

## 2024-05-28 RX ORDER — VANCOMYCIN HCL 1 G
1750 VIAL (EA) INTRAVENOUS EVERY 12 HOURS
Refills: 0 | Status: DISCONTINUED | OUTPATIENT
Start: 2024-05-28 | End: 2024-05-28

## 2024-05-28 RX ORDER — VANCOMYCIN HCL 1 G
1750 VIAL (EA) INTRAVENOUS ONCE
Refills: 0 | Status: DISCONTINUED | OUTPATIENT
Start: 2024-05-28 | End: 2024-05-28

## 2024-05-28 RX ORDER — LOSARTAN POTASSIUM 100 MG/1
100 TABLET, FILM COATED ORAL DAILY
Refills: 0 | Status: DISCONTINUED | OUTPATIENT
Start: 2024-05-29 | End: 2024-05-30

## 2024-05-28 RX ORDER — LOSARTAN POTASSIUM 100 MG/1
50 TABLET, FILM COATED ORAL ONCE
Refills: 0 | Status: COMPLETED | OUTPATIENT
Start: 2024-05-28 | End: 2024-05-28

## 2024-05-28 RX ORDER — VANCOMYCIN HCL 1 G
1750 VIAL (EA) INTRAVENOUS ONCE
Refills: 0 | Status: COMPLETED | OUTPATIENT
Start: 2024-05-28 | End: 2024-05-28

## 2024-05-28 RX ORDER — VANCOMYCIN HCL 1 G
VIAL (EA) INTRAVENOUS
Refills: 0 | Status: DISCONTINUED | OUTPATIENT
Start: 2024-05-28 | End: 2024-05-28

## 2024-05-28 RX ADMIN — Medication 1 SPRAY(S): at 11:32

## 2024-05-28 RX ADMIN — LEVETIRACETAM 500 MILLIGRAM(S): 250 TABLET, FILM COATED ORAL at 17:20

## 2024-05-28 RX ADMIN — LIDOCAINE 1 PATCH: 4 CREAM TOPICAL at 19:00

## 2024-05-28 RX ADMIN — Medication 30 MILLIGRAM(S): at 11:54

## 2024-05-28 RX ADMIN — Medication 100 MILLIGRAM(S): at 02:25

## 2024-05-28 RX ADMIN — Medication 1 MILLIGRAM(S): at 11:31

## 2024-05-28 RX ADMIN — Medication 2 SPRAY(S): at 11:31

## 2024-05-28 RX ADMIN — Medication 2 SPRAY(S): at 07:56

## 2024-05-28 RX ADMIN — Medication 100 MILLIGRAM(S): at 13:04

## 2024-05-28 RX ADMIN — LIDOCAINE 1 PATCH: 4 CREAM TOPICAL at 22:00

## 2024-05-28 RX ADMIN — Medication 100 MILLIGRAM(S): at 10:36

## 2024-05-28 RX ADMIN — LEVETIRACETAM 500 MILLIGRAM(S): 250 TABLET, FILM COATED ORAL at 05:03

## 2024-05-28 RX ADMIN — CEFTRIAXONE 100 MILLIGRAM(S): 500 INJECTION, POWDER, FOR SOLUTION INTRAMUSCULAR; INTRAVENOUS at 05:03

## 2024-05-28 RX ADMIN — Medication 1 TABLET(S): at 11:32

## 2024-05-28 RX ADMIN — CHLORHEXIDINE GLUCONATE 1 APPLICATION(S): 213 SOLUTION TOPICAL at 11:32

## 2024-05-28 RX ADMIN — Medication 10 MILLIGRAM(S): at 12:42

## 2024-05-28 RX ADMIN — LOSARTAN POTASSIUM 50 MILLIGRAM(S): 100 TABLET, FILM COATED ORAL at 13:34

## 2024-05-28 RX ADMIN — Medication 2 SPRAY(S): at 21:04

## 2024-05-28 RX ADMIN — Medication 1000 UNIT(S): at 11:32

## 2024-05-28 RX ADMIN — Medication 2 SPRAY(S): at 00:33

## 2024-05-28 RX ADMIN — Medication 100 MILLIGRAM(S): at 05:03

## 2024-05-28 RX ADMIN — LOSARTAN POTASSIUM 50 MILLIGRAM(S): 100 TABLET, FILM COATED ORAL at 05:04

## 2024-05-28 RX ADMIN — Medication 100 MILLIGRAM(S): at 21:04

## 2024-05-28 RX ADMIN — ATORVASTATIN CALCIUM 80 MILLIGRAM(S): 80 TABLET, FILM COATED ORAL at 21:04

## 2024-05-28 RX ADMIN — ENOXAPARIN SODIUM 40 MILLIGRAM(S): 100 INJECTION SUBCUTANEOUS at 06:24

## 2024-05-28 RX ADMIN — Medication 10 MILLIGRAM(S): at 08:00

## 2024-05-28 RX ADMIN — Medication 2 SPRAY(S): at 15:13

## 2024-05-28 RX ADMIN — LIDOCAINE 1 PATCH: 4 CREAM TOPICAL at 10:34

## 2024-05-28 RX ADMIN — Medication 250 MILLIGRAM(S): at 08:22

## 2024-05-28 NOTE — PROGRESS NOTE ADULT - PROBLEM SELECTOR PLAN 4
- Pt drinks 1L of whiskey? nightly before bed. Unclear if he develops withdrawal seizures or DTs  - Last drink 5/24 evening?  - Monitor on CIWA  - Thiamine (high dose), folate, multivitamin - Pt drinks 1L of whiskey? nightly before bed. Unclear if he develops withdrawal seizures or DTs  - Last drink 5/24 evening?  - Monitor on CIWA - d/c 5/28   - Thiamine (high dose), folate, multivitamin

## 2024-05-28 NOTE — PROGRESS NOTE ADULT - ATTENDING COMMENTS
48 M current smoker and EtOH use who presented to the ED after witnessed seizure like activity  No fevers, no leukocytosis  CT A/P no intra-abd infection  CT vasogenic edema, temporal lesion, periapical abscess, acute on chronic sinusitis  F/U BCXs  UA neg  Unclear significance brain lesions; periapical abscess noted  Overall, Seizure, abscess, brain lesion  - DC Vanco, Ceftriaxone, Flagyl, monitor off abx  - F/U pending BCXs  - Note Dental eval  - Monitor for focal signs infection  - Care for hematoma/seizure per team    Jose D Bond MD  Contact on TEAMS messaging from 9am - 5pm  From 5pm-9am, on weekends, or if no response call [phone]

## 2024-05-28 NOTE — PROGRESS NOTE ADULT - ATTENDING COMMENTS
-MRI brain showed hematoma and not tumor or infection. Will work on gradual normotension. Losartan was increased to 50mg daily. Will add nifedipine 30mg XL daily. -NSGY f/u appreciated. Ask for neuro input. EEG on today. ?duration of keppra. -A1c 5.9, preDM. LDL above goal. Would c/w atorvastatin qhs. -C/w tele. -Echo.   -F/u ID recs regarding sinusitis mgmt. Needs outpt dental/ENT.   -Stable on CIWA, can DC. -Counseled patient to avoid alcohol in setting of brain bleed.   -Spoke with patient using  [facility] ID [id number].  -D/w house staff.

## 2024-05-28 NOTE — PROGRESS NOTE ADULT - ASSESSMENT
WORK UP:      ANTIBIOTIC:      IMPRESSION:        SUGGESTIONS:        Above recommendations are Preliminary until Attending's Addendum which includes Final Recommendations      Lico Dickens DO, PGY-5   ID fellow  [employer] Teams Preferred  After 5pm/weekends call [phone]   48 M current smoker and EtOH use who presented to the ED after witnessed seizure like activity  No fevers, no leukocytosis  CT A/P no intra-abd infection  CT vasogenic edema, temporal lesion, periapical abscess, acute on chronic sinusitis  UA neg  MRI with L posterior temporal lobe hematoma, no infection or neoplasm  BCX NGTD    IMPRESSION:  Overall, Seizure, abscess, brain lesion  - Unclear significance brain lesions; periapical abscess noted  - Dental eval appreciated, no acute findings      SUGGESTIONS:  - DC Vanco, Ceftriaxone, Flagyl, monitor off abx  - F/U pending BCXs  - Note Dental eval  - Monitor for focal signs infection  - Care for hematoma/seizure per team      Above recommendations are Preliminary until Attending's Addendum which includes Final Recommendations      Lico Dickens DO, PGY-5   ID fellow  [facility] Teams Preferred  After 5pm/weekends call [phone]

## 2024-05-28 NOTE — PROGRESS NOTE ADULT - PROBLEM SELECTOR PLAN 5
- Outpatient consult with [facility] Oral [facility]ology for tongue lesions for possible -[phone].  - Dental F/U with outpatient dentist for extraction of root tips and comprehensive dental care.

## 2024-05-28 NOTE — PROGRESS NOTE ADULT - PROBLEM SELECTOR PLAN 6
- Pt with BP's >200>110's in ED. Asymptomatic  - Start losartan 25mg qd to bring down pressures slowly. Increase to 50mg qdaily (5/28 -   - PRN hydralazine for Systolic >180 - Pt with BP's >200>110's in ED. Asymptomatic  - Start losartan 25mg qd to bring down pressures slowly. Increase to 50mg qdaily (5/28 -   - Start nifedipine 30mg qdaily   - PRN hydralazine for Systolic >180

## 2024-05-28 NOTE — PROGRESS NOTE ADULT - PROBLEM SELECTOR PLAN 1
- CT H with low-density lesion is noted in the left middle temporal gyrus, with surrounding vasogenic edema. This measures approximately 2.5 x 1.1 x 2.3cm. Differential includes malignancy vs infection vs ischemic event, but given sinus abscesses would treat as a brain abscess  - Vancomycin (5/26 - )  - Ceftriaxone 2g q12 hrs (5/26 - )  - Metronidazole 500 q8 hrs (5/26 - )  - Ordered MR brain - L mid posterior temporal lobe hematoma with small surrounding edema/gliosis, no midline shift   - F/u blood cultures (5/26 - )  - NSX following  - ID following - CT H with low-density lesion is noted in the left middle temporal gyrus, with surrounding vasogenic edema. This measures approximately 2.5 x 1.1 x 2.3cm. Differential includes malignancy vs infection vs ischemic event, but given sinus abscesses would treat as a brain abscess  - Vancomycin (5/26 - )  - Ceftriaxone 2g q12 hrs (5/26 - )  - Metronidazole 500 q8 hrs (5/26 - )  - Ordered MR brain - L mid posterior temporal lobe hematoma with small surrounding edema/gliosis, no midline shift   - F/u blood cultures (5/26 - ) - No growth to date   - NSX following  - ID following

## 2024-05-28 NOTE — PHARMACOTHERAPY INTERVENTION NOTE - COMMENTS
Confirmed patient does not take any medications at home. Occasionally he would take Tylenol as needed. Updated in Outpatient Medication Review.    Patient does not have insurance Confirmed patient does not take any medications at home. Occasionally he would take Tylenol as needed. Updated in Outpatient Medication Review. Patient does not have insurance (pharmacy), Confirmed no allergies and has no preference of pharmacy. Okay to use [facility].    MEDICATIONS  (STANDING):  atorvastatin 80 milliGRAM(s) Oral at bedtime  benzonatate 100 milliGRAM(s) Oral three times a day  cefTRIAXone   IVPB      cefTRIAXone   IVPB 2000 milliGRAM(s) IV Intermittent every 12 hours  cholecalciferol 1000 Unit(s) Oral daily  enoxaparin Injectable 40 milliGRAM(s) SubCutaneous every 24 hours  fluticasone propionate 50 MICROgram(s)/spray Nasal Spray 1 Spray(s) Both Nostrils <User Schedule>  folic acid 1 milliGRAM(s) Oral daily  lactated ringers. 1000 milliLiter(s) (75 mL/Hr) IV Continuous <Continuous>  levETIRAcetam 500 milliGRAM(s) Oral two times a day  lidocaine   4% Patch 1 Patch Transdermal every 24 hours  losartan 50 milliGRAM(s) Oral daily  metroNIDAZOLE  IVPB 500 milliGRAM(s) IV Intermittent every 8 hours  multivitamin 1 Tablet(s) Oral daily  sodium chloride 0.65% Nasal 2 Spray(s) Both Nostrils five times a day  vancomycin  IVPB 1750 milliGRAM(s) IV Intermittent every 12 hours    Donald Rosado (Jian Ming)  Transitions of Care Pharmacist  Available on [employer] Teams (Preferred)  Spectra: [phone]

## 2024-05-28 NOTE — PROGRESS NOTE ADULT - SUBJECTIVE AND OBJECTIVE BOX
Follow Up:      Interval History/ROS:Patient is a 48y old  Male who presents with a chief complaint of Seizure, brain mass (28 May 2024 09:00)      REVIEW OF SYSTEMS  [  ] ROS unobtainable because:    [ x ] All other systems negative except as noted below    Constitutional:  [ ] fever [ ] chills  [ ] weight loss  [ ]night sweat  [ ]poor appetite/PO intake [ ]fatigue   Skin:  [ ] rash [ ] phlebitis	  Eyes: [ ] icterus [ ] pain  [ ] discharge	  ENMT: [ ] sore throat  [ ] thrush [ ] ulcers [ ] exudates [ ]anosmia  Respiratory: [ ] dyspnea [ ] hemoptysis [ ] cough [ ] sputum	  Cardiovascular:  [ ] chest pain [ ] palpitations [ ] edema	  Gastrointestinal:  [ ] nausea [ ] vomiting [ ] diarrhea [ ] constipation [ ] pain	  Genitourinary:  [ ] dysuria [ ] frequency [ ] hematuria [ ] discharge [ ] flank pain  [ ] incontinence  Musculoskeletal:  [ ] myalgias [ ] arthralgias [ ] arthritis  [ ] back pain  Neurological:  [ ] headache [ ] weakness [ ] seizures  [ ] confusion/altered mental status    Allergies  No Known Allergies        ANTIMICROBIALS:    cefTRIAXone   IVPB    cefTRIAXone   IVPB 2000 every 12 hours  metroNIDAZOLE  IVPB 500 every 8 hours  vancomycin  IVPB 1750 every 12 hours        OTHER MEDS: MEDICATIONS  (STANDING):  acetaminophen     Tablet .. 650 every 6 hours PRN  atorvastatin 80 at bedtime  benzonatate 100 three times a day  enoxaparin Injectable 40 every 24 hours  guaiFENesin  every 12 hours PRN  hydrALAZINE Injectable 10 every 4 hours PRN  levETIRAcetam 500 two times a day  losartan 50 daily  NIFEdipine XL 30 daily      Vital Signs Last 24 Hrs  T(F): 97.8 (05-28-24 @ 12:00), Max: 99.7 (05-26-24 @ 01:13)    Vital Signs Last 24 Hrs  HR: 71 (05-28-24 @ 12:38) (71 - 90)  BP: 186/116 (05-28-24 @ 12:38) (153/84 - 195/110)  RR: 18 (05-28-24 @ 12:38)  SpO2: 98% (05-28-24 @ 12:38) (96% - 99%)  Wt(kg): --    EXAM:                Labs:                        12.6   5.57  )-----------( 108      ( 28 May 2024 05:16 )             36.6     05-28    140  |  105  |  9   ----------------------------<  109<H>  3.7   |  25  |  0.86    Ca    8.8      28 May 2024 05:16  Phos  4.3     05-28  Mg     2.0     05-28    TPro  5.9<L>  /  Alb  3.7  /  TBili  0.8  /  DBili  x   /  AST  46<H>  /  ALT  46<H>  /  AlkPhos  86  05-28      WBC Trend:  WBC Count: 5.57 (05-28-24 @ 05:16)  WBC Count: 4.85 (05-27-24 @ 06:58)  WBC Count: 5.95 (05-26-24 @ 06:36)  WBC Count: 5.94 (05-25-24 @ 14:27)      Creatine Trend:  Creatinine: 0.86 (05-28)  Creatinine: 0.88 (05-27)  Creatinine: 0.93 (05-26)  Creatinine: 0.71 (05-26)      Liver Biochemical Testing Trend:  Alanine Aminotransferase (ALT/SGPT): 46 *H* (05-28)  Alanine Aminotransferase (ALT/SGPT): 41 (05-27)  Alanine Aminotransferase (ALT/SGPT): 47 *H* (05-26)  Alanine Aminotransferase (ALT/SGPT): 52 *H* (05-25)  Aspartate Aminotransferase (AST/SGOT): 46 (05-28-24 @ 05:16)  Aspartate Aminotransferase (AST/SGOT): 47 (05-27-24 @ 06:59)  Aspartate Aminotransferase (AST/SGOT): 65 (05-26-24 @ 06:36)  Aspartate Aminotransferase (AST/SGOT): 61 (05-25-24 @ 14:27)  Bilirubin Total: 0.8 (05-28)  Bilirubin Total: 1.3 (05-27)  Bilirubin Total: 1.6 (05-26)  Bilirubin Total: 1.3 (05-25)      Trend LDH      Urinalysis Basic - ( 28 May 2024 05:16 )    Color: x / Appearance: x / SG: x / pH: x  Gluc: 109 mg/dL / Ketone: x  / Bili: x / Urobili: x   Blood: x / Protein: x / Nitrite: x   Leuk Esterase: x / RBC: x / WBC x   Sq Epi: x / Non Sq Epi: x / Bacteria: x        MICROBIOLOGY:  Vancomycin Level, Trough: 12.0 (05-28 @ 05:16)    MRSA PCR Result.: NotDetec (05-26-24 @ 06:37)      Culture - Blood (collected 26 May 2024 01:33)  Source: .Blood Blood-Peripheral  Preliminary Report:    No growth at 48 Hours    Culture - Blood (collected 26 May 2024 01:33)  Source: .Blood Blood-Peripheral  Preliminary Report:    No growth at 48 Hours      HIV-1/2 Combo Result: Nonreact (05-26-24 @ 01:42)  HIV-1/2 Combo Result: Nonreact (05-25-24 @ 16:06)            Troponin T, High Sensitivity Result: 38 (05-26)  Troponin T, High Sensitivity Result: 28 (05-25)    Blood Gas Venous - Lactate: 1.5 (05-25 @ 17:32)  Blood Gas Venous - Lactate: 5.0 (05-25 @ 14:02)        RADIOLOGY:  imaging below personally reviewed                CT Abdomen and Pelvis w/ IV Cont:   ACC: [id number] EXAM:  CT ABDOMEN AND PELVIS IC   ORDERED BY: YINKA FIGUEROA     PROCEDURE DATE:  05/25/2024          INTERPRETATION:  CLINICAL INFORMATION: Diffuse abdominal pain and   tenderness, new seizure.    COMPARISON: CT abdomen and pelvis 5/8/2022.    CONTRAST/COMPLICATIONS:  IV Contrast: Omnipaque 350  90 cc administered   10 cc discarded  Oral Contrast: NONE  Complications: None reported at time of study completion    PROCEDURE:  CT of the Abdomen and Pelvis was performed.  Sagittal and coronal reformats were performed.    FINDINGS:  LOWER CHEST: Right lower lobe linear atelectasis.    LIVER: Diffuse hepatic steatosis. The liver surface contour is grossly   smooth. No discrete hepatic lesion identified.  BILE DUCTS: Normal caliber.  GALLBLADDER: Within normal limits.  SPLEEN: Within normal limits.  PANCREAS: Within normal limits.  ADRENALS: Within normal limits.  KIDNEYS/URETERS: Duplicated right renal collecting system with possible   fusion to a common ureter just proximal to the UVJ. There is a 1.4 cm   simple exophytic right lower pole renal cyst (4-60). The kidneys are   otherwise within normal limits.    BLADDER: Within normal limits.  REPRODUCTIVE ORGANS: Normal-sized prostate.    BOWEL: No bowel obstruction. Appendix is normal.  PERITONEUM: No ascites.  VESSELS: Atherosclerotic changes. No evidence of aneurysm.  RETROPERITONEUM/LYMPH NODES: No lymphadenopathy.  ABDOMINAL WALL: Within normal limits.  BONES: Degenerative changes.    IMPRESSION:  No acute intra-abdominal findings.    Hepatic steatosis.        --- End of Report ---           NABIL AKINS MD; Resident Radiologist  This document has been electronically signed.  BRIGETTE SAHNI MD; Attending Radiologist  This document has been electronically signed. May 25 2024  7:09PM (05-25-24 @ 18:35)  CT Head No Cont:   ACC: [id number] EXAM:  CT BRAIN   ORDERED BY: YINKA FIGUEROA     ACC: [id number] EXAM:  CT 3D RECONSTRUCT W [facility]   ORDERED BY: YINKA FIGUEROA     ACC: [id number] EXAM:  CT MAXILLOFACIAL   ORDERED BY: YINKA FIGUEROA     PROCEDURE DATE:  05/25/2024          INTERPRETATION:  EXAMINATION: CT MAXILLOFACIAL, CT 3D RECONSTRUCTION W   WORKSTATION, CT HEAD    CLINICAL INDICATION: concern facial fractures  TECHNIQUE: CT images of the head and facial bones were obtained without   contrast. Dose reduction techniques were utilized including but not   limited to automated exposure control (AEC), iterative reconstruction   technique, and/or mA and/or kV dose adjustments based on patient size.  COMPARISON: None.    FINDINGS:    A low-density lesion is noted in the left middle temporal gyrus, with   surrounding vasogenic edema. This measures approximately 2.5 x 1.1 x 2.3   cm, although the dimensions of the lesion are somewhat arbitrary due to   its indistinct margins. The appearance is nonspecific and differential   diagnostic considerations would include metastatic disease, abscess,   evolving contusion, and an ischemic event. Further characterization with   an MRI with gadolinium is recommended    Cerebral volume is within normal limits. No premature white matter   disease.    No acute intracranial hemorrhage. No midline shift or herniation. No CT   evidence of acute territorial infarction, although MRI with DWI would be   more sensitive. There is a prominent cisterna magna versus arachnoid cyst   in the left posterior fossa, a common incidental finding.    There are periapical lucencies in the left maxilla involving the roots of   the first right upper premolar and molar. The findings are nonspecific   and differential diagnostic considerations would include periapical   abscess, cyst, granuloma, as well as a variety of other less likely   etiologies. Correlate clinically.    There is moderate fluid and mucus in the left maxillary sinus, with mild   to moderate opacities in the ethmoid cavities, and the right maxillary   sinus. 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 The appearance is consistent with   acute on chronic sinusitis, potentially odontogenic in light of the   above. There is hyperdense material within the fluid/mucus, possibly   blood or fungal colonization.    There is straightening of the normal cervical lordosis, with narrowing of   the disc space and moderate bilateral osteophytic foraminal encroachment   at C5-6.    The facial bones are otherwise negative, without evidence of a fracture.   There is no TMJ dislocation. The maxillary, sphenoid, mandible, zygoma,   and nasal bones are negative.  The visualized portions of the frontal and   temporal bones are unremarkable. The walls of the orbits and maxillary   sinuses are intact. The visualized soft tissues of the face and upper   neck are otherwise negative. No incidental lytic or sclerotic lesions.    The globes, lenses, optic nerves, optic sheaths, orbital fat, extraocular   muscles, optic chiasm, suprasellar cistern, sella, and lacrimal glands   are otherwise grossly negative, although a contrast enhanced study would   be more sensitive. The calvarium is within normal limits.      IMPRESSION:    1.  Left temporal lesion with surrounding vasogenic edema.  2.  Recommend MRI with gadolinium to exclude malignancy/infection.  3.  Periapical abscesses with acute on chronic sinusitis.  4.  Correlate clinically to exclude fungal colonization.    Dr. Cheek called report to  Merary Phan  05/25/24 07:34 PM.    Patient Name: MARJORIE PINO  MRN: [id number], Accession: [id number], [id number], [id number]  DOS: 05/25/24 06:34 PM    --- End of Report ---       Follow Up:      Interval History/ROS: Afebrile overnight. No complaints this AM. Denies any fever or chills.       REVIEW OF SYSTEMS  [  ] ROS unobtainable because:    [ x ] All other systems negative except as noted below    Constitutional:  [ ] fever [ ] chills  [ ] weight loss  [ ]night sweat  [ ]poor appetite/PO intake [ ]fatigue   Skin:  [ ] rash [ ] phlebitis	  Eyes: [ ] icterus [ ] pain  [ ] discharge	  ENMT: [ ] sore throat  [ ] thrush [ ] ulcers [ ] exudates [ ]anosmia  Respiratory: [ ] dyspnea [ ] hemoptysis [ ] cough [ ] sputum	  Cardiovascular:  [ ] chest pain [ ] palpitations [ ] edema	  Gastrointestinal:  [ ] nausea [ ] vomiting [ ] diarrhea [ ] constipation [ ] pain	  Genitourinary:  [ ] dysuria [ ] frequency [ ] hematuria [ ] discharge [ ] flank pain  [ ] incontinence  Musculoskeletal:  [ ] myalgias [ ] arthralgias [ ] arthritis  [ ] back pain  Neurological:  [ ] headache [ ] weakness [ ] seizures  [ ] confusion/altered mental status    Allergies  No Known Allergies        ANTIMICROBIALS:    cefTRIAXone   IVPB    cefTRIAXone   IVPB 2000 every 12 hours  metroNIDAZOLE  IVPB 500 every 8 hours  vancomycin  IVPB 1750 every 12 hours        OTHER MEDS: MEDICATIONS  (STANDING):  acetaminophen     Tablet .. 650 every 6 hours PRN  atorvastatin 80 at bedtime  benzonatate 100 three times a day  enoxaparin Injectable 40 every 24 hours  guaiFENesin  every 12 hours PRN  hydrALAZINE Injectable 10 every 4 hours PRN  levETIRAcetam 500 two times a day  losartan 50 daily  NIFEdipine XL 30 daily      Vital Signs Last 24 Hrs  T(F): 97.8 (05-28-24 @ 12:00), Max: 99.7 (05-26-24 @ 01:13)    Vital Signs Last 24 Hrs  HR: 71 (05-28-24 @ 12:38) (71 - 90)  BP: 186/116 (05-28-24 @ 12:38) (153/84 - 195/110)  RR: 18 (05-28-24 @ 12:38)  SpO2: 98% (05-28-24 @ 12:38) (96% - 99%)  Wt(kg): --    EXAM:  Physical Exam:  Constitutional:  well preserved, comfortable  Head/Eyes: no icterus  LUNGS:  CTA  CVS:  regular rhythm  Abd:  soft, non-tender; non-distended  Ext:  no edema  Vascular:  IV site no erythema tenderness or discharge  Neuro: AAO X 3, non- focal              Labs:                        12.6   5.57  )-----------( 108      ( 28 May 2024 05:16 )             36.6     05-28    140  |  105  |  9   ----------------------------<  109<H>  3.7   |  25  |  0.86    Ca    8.8      28 May 2024 05:16  Phos  4.3     05-28  Mg     2.0     05-28    TPro  5.9<L>  /  Alb  3.7  /  TBili  0.8  /  DBili  x   /  AST  46<H>  /  ALT  46<H>  /  AlkPhos  86  05-28      WBC Trend:  WBC Count: 5.57 (05-28-24 @ 05:16)  WBC Count: 4.85 (05-27-24 @ 06:58)  WBC Count: 5.95 (05-26-24 @ 06:36)  WBC Count: 5.94 (05-25-24 @ 14:27)      Creatine Trend:  Creatinine: 0.86 (05-28)  Creatinine: 0.88 (05-27)  Creatinine: 0.93 (05-26)  Creatinine: 0.71 (05-26)      Liver Biochemical Testing Trend:  Alanine Aminotransferase (ALT/SGPT): 46 *H* (05-28)  Alanine Aminotransferase (ALT/SGPT): 41 (05-27)  Alanine Aminotransferase (ALT/SGPT): 47 *H* (05-26)  Alanine Aminotransferase (ALT/SGPT): 52 *H* (05-25)  Aspartate Aminotransferase (AST/SGOT): 46 (05-28-24 @ 05:16)  Aspartate Aminotransferase (AST/SGOT): 47 (05-27-24 @ 06:59)  Aspartate Aminotransferase (AST/SGOT): 65 (05-26-24 @ 06:36)  Aspartate Aminotransferase (AST/SGOT): 61 (05-25-24 @ 14:27)  Bilirubin Total: 0.8 (05-28)  Bilirubin Total: 1.3 (05-27)  Bilirubin Total: 1.6 (05-26)  Bilirubin Total: 1.3 (05-25)      Trend LDH      Urinalysis Basic - ( 28 May 2024 05:16 )    Color: x / Appearance: x / SG: x / pH: x  Gluc: 109 mg/dL / Ketone: x  / Bili: x / Urobili: x   Blood: x / Protein: x / Nitrite: x   Leuk Esterase: x / RBC: x / WBC x   Sq Epi: x / Non Sq Epi: x / Bacteria: x        MICROBIOLOGY:  Vancomycin Level, Trough: 12.0 (05-28 @ 05:16)    MRSA PCR Result.: NotDetec (05-26-24 @ 06:37)      Culture - Blood (collected 26 May 2024 01:33)  Source: .Blood Blood-Peripheral  Preliminary Report:    No growth at 48 Hours    Culture - Blood (collected 26 May 2024 01:33)  Source: .Blood Blood-Peripheral  Preliminary Report:    No growth at 48 Hours      HIV-1/2 Combo Result: Nonreact (05-26-24 @ 01:42)  HIV-1/2 Combo Result: Nonreact (05-25-24 @ 16:06)            Troponin T, High Sensitivity Result: 38 (05-26)  Troponin T, High Sensitivity Result: 28 (05-25)    Blood Gas Venous - Lactate: 1.5 (05-25 @ 17:32)  Blood Gas Venous - Lactate: 5.0 (05-25 @ 14:02)        RADIOLOGY:  imaging below personally reviewed                CT Abdomen and Pelvis w/ IV Cont:   ACC: [id number] EXAM:  CT ABDOMEN AND PELVIS IC   ORDERED BY: YINKA FIGUEROA     PROCEDURE DATE:  05/25/2024          INTERPRETATION:  CLINICAL INFORMATION: Diffuse abdominal pain and   tenderness, new seizure.    COMPARISON: CT abdomen and pelvis 5/8/2022.    CONTRAST/COMPLICATIONS:  IV Contrast: Omnipaque 350  90 cc administered   10 cc discarded  Oral Contrast: NONE  Complications: None reported at time of study completion    PROCEDURE:  CT of the Abdomen and Pelvis was performed.  Sagittal and coronal reformats were performed.    FINDINGS:  LOWER CHEST: Right lower lobe linear atelectasis.    LIVER: Diffuse hepatic steatosis. The liver surface contour is grossly   smooth. No discrete hepatic lesion identified.  BILE DUCTS: Normal caliber.  GALLBLADDER: Within normal limits.  SPLEEN: Within normal limits.  PANCREAS: Within normal limits.  ADRENALS: Within normal limits.  KIDNEYS/URETERS: Duplicated right renal collecting system with possible   fusion to a common ureter just proximal to the UVJ. There is a 1.4 cm   simple exophytic right lower pole renal cyst (4-60). The kidneys are   otherwise within normal limits.    BLADDER: Within normal limits.  REPRODUCTIVE ORGANS: Normal-sized prostate.    BOWEL: No bowel obstruction. Appendix is normal.  PERITONEUM: No ascites.  VESSELS: Atherosclerotic changes. No evidence of aneurysm.  RETROPERITONEUM/LYMPH NODES: No lymphadenopathy.  ABDOMINAL WALL: Within normal limits.  BONES: Degenerative changes.    IMPRESSION:  No acute intra-abdominal findings.    Hepatic steatosis.        --- End of Report ---           NABIL AKINS MD; Resident Radiologist  This document has been electronically signed.  BRIGETTE SAHNI MD; Attending Radiologist  This document has been electronically signed. May 25 2024  7:09PM (05-25-24 @ 18:35)  CT Head No Cont:   ACC: [id number] EXAM:  CT BRAIN   ORDERED BY: YINKA FIGUEROA     ACC: [id number] EXAM:  CT 3D RECONSTRUCT W [facility]   ORDERED BY: YINKA FIGUEROA     ACC: [id number] EXAM:  CT MAXILLOFACIAL   ORDERED BY: YINKA FIGUEROA     PROCEDURE DATE:  05/25/2024          INTERPRETATION:  EXAMINATION: CT MAXILLOFACIAL, CT 3D RECONSTRUCTION W   WORKSTATION, CT HEAD    CLINICAL INDICATION: concern facial fractures  TECHNIQUE: CT images of the head and facial bones were obtained without   contrast. Dose reduction techniques were utilized including but not   limited to automated exposure control (AEC), iterative reconstruction   technique, and/or mA and/or kV dose adjustments based on patient size.  COMPARISON: None.    FINDINGS:    A low-density lesion is noted in the left middle temporal gyrus, with   surrounding vasogenic edema. This measures approximately 2.5 x 1.1 x 2.3   cm, although the dimensions of the lesion are somewhat arbitrary due to   its indistinct margins. The appearance is nonspecific and differential   diagnostic considerations would include metastatic disease, abscess,   evolving contusion, and an ischemic event. Further characterization with   an MRI with gadolinium is recommended    Cerebral volume is within normal limits. No premature white matter   disease.    No acute intracranial hemorrhage. No midline shift or herniation. No CT   evidence of acute territorial infarction, although MRI with DWI would be   more sensitive. There is a prominent cisterna magna versus arachnoid cyst   in the left posterior fossa, a common incidental finding.    There are periapical lucencies in the left maxilla involving the roots of   the first right upper premolar and molar. The findings are nonspecific   and differential diagnostic considerations would include periapical   abscess, cyst, granuloma, as well as a variety of other less likely   etiologies. Correlate clinically.    There is moderate fluid and mucus in the left maxillary sinus, with mild   to moderate opacities in the ethmoid cavities, and the right maxillary   sinus. 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 The appearance is consistent with   acute on chronic sinusitis, potentially odontogenic in light of the   above. There is hyperdense material within the fluid/mucus, possibly   blood or fungal colonization.    There is straightening of the normal cervical lordosis, with narrowing of   the disc space and moderate bilateral osteophytic foraminal encroachment   at C5-6.    The facial bones are otherwise negative, without evidence of a fracture.   There is no TMJ dislocation. The maxillary, sphenoid, mandible, zygoma,   and nasal bones are negative.  The visualized portions of the frontal and   temporal bones are unremarkable. The walls of the orbits and maxillary   sinuses are intact. The visualized soft tissues of the face and upper   neck are otherwise negative. No incidental lytic or sclerotic lesions.    The globes, lenses, optic nerves, optic sheaths, orbital fat, extraocular   muscles, optic chiasm, suprasellar cistern, sella, and lacrimal glands   are otherwise grossly negative, although a contrast enhanced study would   be more sensitive. The calvarium is within normal limits.      IMPRESSION:    1.  Left temporal lesion with surrounding vasogenic edema.  2.  Recommend MRI with gadolinium to exclude malignancy/infection.  3.  Periapical abscesses with acute on chronic sinusitis.  4.  Correlate clinically to exclude fungal colonization.    Dr. Cheek called report to  Merary Phan  05/25/24 07:34 PM.    Patient Name: MARJORIE PINO  MRN: [id number], Accession: [id number], [id number]  DOS: 05/25/24 06:34 PM    --- End of Report ---

## 2024-05-28 NOTE — CHART NOTE - NSCHARTNOTEFT_GEN_A_CORE
Initial 120 minutes of record reviewed.  There are no epileptiform abnormalities noted. Background is continuous.   Full report to follow after review of completed study tomorrow.     NICK Joel.  Attending Physician, [facility]      [facility] EEG Reading Room Ph#: [phone]  Epilepsy Answering Service after 5PM and before 8:30AM: Ph#: [phone]

## 2024-05-28 NOTE — PROGRESS NOTE ADULT - SUBJECTIVE AND OBJECTIVE BOX
DATE OF SERVICE: 05-28-24 @ 09:00    Patient is a 48y old  Male who presents with a chief complaint of Seizure, brain mass (27 May 2024 08:50)      SUBJECTIVE / OVERNIGHT EVENTS:  (Maximo [id number]). No acute events overnight.     MEDICATIONS  (STANDING):  atorvastatin 80 milliGRAM(s) Oral at bedtime  benzonatate 100 milliGRAM(s) Oral three times a day  cefTRIAXone   IVPB      cefTRIAXone   IVPB 2000 milliGRAM(s) IV Intermittent every 12 hours  cholecalciferol 1000 Unit(s) Oral daily  enoxaparin Injectable 40 milliGRAM(s) SubCutaneous every 24 hours  fluticasone propionate 50 MICROgram(s)/spray Nasal Spray 1 Spray(s) Both Nostrils <User Schedule>  folic acid 1 milliGRAM(s) Oral daily  lactated ringers. 1000 milliLiter(s) (75 mL/Hr) IV Continuous <Continuous>  levETIRAcetam 500 milliGRAM(s) Oral two times a day  lidocaine   4% Patch 1 Patch Transdermal every 24 hours  losartan 50 milliGRAM(s) Oral daily  metroNIDAZOLE  IVPB 500 milliGRAM(s) IV Intermittent every 8 hours  multivitamin 1 Tablet(s) Oral daily  sodium chloride 0.65% Nasal 2 Spray(s) Both Nostrils five times a day  vancomycin  IVPB 1750 milliGRAM(s) IV Intermittent every 12 hours    MEDICATIONS  (PRN):  acetaminophen     Tablet .. 650 milliGRAM(s) Oral every 6 hours PRN Mild Pain (1 - 3)  guaiFENesin  milliGRAM(s) Oral every 12 hours PRN Cough  hydrALAZINE Injectable 10 milliGRAM(s) IV Push every 4 hours PRN SBP>180  LORazepam     Tablet 2 milliGRAM(s) Oral every 2 hours PRN CIWA-Ar score increase by 2 points and a total score of 7 or less  LORazepam   Injectable 2 milliGRAM(s) IV Push every 1 hour PRN Symptom-triggered: each CIWA -Ar score 8 or GREATER      Vital Signs Last 24 Hrs  T(C): 36.6 (28 May 2024 08:00), Max: 37.2 (27 May 2024 21:03)  T(F): 97.9 (28 May 2024 08:00), Max: 99 (27 May 2024 21:03)  HR: 79 (28 May 2024 08:22) (72 - 90)  BP: 167/97 (28 May 2024 08:22) (153/84 - 195/110)  BP(mean): --  RR: 18 (28 May 2024 08:00) (18 - 18)  SpO2: 98% (28 May 2024 08:00) (96% - 100%)    Parameters below as of 28 May 2024 08:00  Patient On (Oxygen Delivery Method): room air      CAPILLARY BLOOD GLUCOSE        I&O's Summary    27 May 2024 07:01  -  28 May 2024 07:00  --------------------------------------------------------  IN: 500 mL / OUT: 2101 mL / NET: -1601 mL        PHYSICAL EXAM:  GENERAL: NAD, well-developed  HEAD:  Atraumatic, Normocephalic  EYES: EOMI, PERRLA, medial pterygium on both eyes  NECK: Supple, No JVD  CHEST/LUNG: Clear to auscultation bilaterally; No wheeze, coughing   HEART: Regular rate and rhythm; No murmurs  ABDOMEN: Soft, Nontender, Nondistended  EXTREMITIES:  2+ Peripheral Pulses, No edema, clubbing present   PSYCH: AAOx1-2  NEUROLOGY: non-focal  SKIN: No rashes or lesions    LABS:                        12.6   5.57  )-----------( 108      ( 28 May 2024 05:16 )             36.6     05-28    140  |  105  |  9   ----------------------------<  109<H>  3.7   |  25  |  0.86    Ca    8.8      28 May 2024 05:16  Phos  4.3     05-28  Mg     2.0     05-28    TPro  5.9<L>  /  Alb  3.7  /  TBili  0.8  /  DBili  x   /  AST  46<H>  /  ALT  46<H>  /  AlkPhos  86  05-28      CARDIAC MARKERS ( 28 May 2024 05:16 )  x     / x     / 457 U/L / x     / x      CARDIAC MARKERS ( 27 May 2024 06:59 )  x     / x     / 784 U/L / x     / x          Urinalysis Basic - ( 28 May 2024 05:16 )    Color: x / Appearance: x / SG: x / pH: x  Gluc: 109 mg/dL / Ketone: x  / Bili: x / Urobili: x   Blood: x / Protein: x / Nitrite: x   Leuk Esterase: x / RBC: x / WBC x   Sq Epi: x / Non Sq Epi: x / Bacteria: x        RADIOLOGY & ADDITIONAL TESTS:    Imaging Personally Reviewed:    Consultant(s) Notes Reviewed:      Care Discussed with Consultants/Other Providers:   DATE OF SERVICE: 05-28-24 @ 09:00    Patient is a 48y old  Male who presents with a chief complaint of Seizure, brain mass (27 May 2024 08:50)      SUBJECTIVE / OVERNIGHT EVENTS:  (Maximo [id number]). No acute events overnight. Starting nifedipine 30 qdaily on patient for further blood pressure control. MR head resulted with L mid posterior temporal lobe hematoma, no evidence of infection or malignancy.     MEDICATIONS  (STANDING):  atorvastatin 80 milliGRAM(s) Oral at bedtime  benzonatate 100 milliGRAM(s) Oral three times a day  cefTRIAXone   IVPB      cefTRIAXone   IVPB 2000 milliGRAM(s) IV Intermittent every 12 hours  cholecalciferol 1000 Unit(s) Oral daily  enoxaparin Injectable 40 milliGRAM(s) SubCutaneous every 24 hours  fluticasone propionate 50 MICROgram(s)/spray Nasal Spray 1 Spray(s) Both Nostrils <User Schedule>  folic acid 1 milliGRAM(s) Oral daily  lactated ringers. 1000 milliLiter(s) (75 mL/Hr) IV Continuous <Continuous>  levETIRAcetam 500 milliGRAM(s) Oral two times a day  lidocaine   4% Patch 1 Patch Transdermal every 24 hours  losartan 50 milliGRAM(s) Oral daily  metroNIDAZOLE  IVPB 500 milliGRAM(s) IV Intermittent every 8 hours  multivitamin 1 Tablet(s) Oral daily  sodium chloride 0.65% Nasal 2 Spray(s) Both Nostrils five times a day  vancomycin  IVPB 1750 milliGRAM(s) IV Intermittent every 12 hours    MEDICATIONS  (PRN):  acetaminophen     Tablet .. 650 milliGRAM(s) Oral every 6 hours PRN Mild Pain (1 - 3)  guaiFENesin  milliGRAM(s) Oral every 12 hours PRN Cough  hydrALAZINE Injectable 10 milliGRAM(s) IV Push every 4 hours PRN SBP>180  LORazepam     Tablet 2 milliGRAM(s) Oral every 2 hours PRN CIWA-Ar score increase by 2 points and a total score of 7 or less  LORazepam   Injectable 2 milliGRAM(s) IV Push every 1 hour PRN Symptom-triggered: each CIWA -Ar score 8 or GREATER      Vital Signs Last 24 Hrs  T(C): 36.6 (28 May 2024 08:00), Max: 37.2 (27 May 2024 21:03)  T(F): 97.9 (28 May 2024 08:00), Max: 99 (27 May 2024 21:03)  HR: 79 (28 May 2024 08:22) (72 - 90)  BP: 167/97 (28 May 2024 08:22) (153/84 - 195/110)  BP(mean): --  RR: 18 (28 May 2024 08:00) (18 - 18)  SpO2: 98% (28 May 2024 08:00) (96% - 100%)    Parameters below as of 28 May 2024 08:00  Patient On (Oxygen Delivery Method): room air      CAPILLARY BLOOD GLUCOSE        I&O's Summary    27 May 2024 07:01  -  28 May 2024 07:00  --------------------------------------------------------  IN: 500 mL / OUT: 2101 mL / NET: -1601 mL        PHYSICAL EXAM:  GENERAL: NAD, well-developed  HEAD:  Atraumatic, Normocephalic  EYES: EOMI, PERRLA, medial pterygium on both eyes  NECK: Supple, No JVD  CHEST/LUNG: Clear to auscultation bilaterally; No wheeze, coughing   HEART: Regular rate and rhythm; No murmurs  ABDOMEN: Soft, Nontender, Nondistended  EXTREMITIES:  2+ Peripheral Pulses, No edema, clubbing present   PSYCH: AAOx3  NEUROLOGY: non-focal  SKIN: No rashes or lesions    LABS:                        12.6   5.57  )-----------( 108      ( 28 May 2024 05:16 )             36.6     05-28    140  |  105  |  9   ----------------------------<  109<H>  3.7   |  25  |  0.86    Ca    8.8      28 May 2024 05:16  Phos  4.3     05-28  Mg     2.0     05-28    TPro  5.9<L>  /  Alb  3.7  /  TBili  0.8  /  DBili  x   /  AST  46<H>  /  ALT  46<H>  /  AlkPhos  86  05-28      CARDIAC MARKERS ( 28 May 2024 05:16 )  x     / x     / 457 U/L / x     / x      CARDIAC MARKERS ( 27 May 2024 06:59 )  x     / x     / 784 U/L / x     / x          Urinalysis Basic - ( 28 May 2024 05:16 )    Color: x / Appearance: x / SG: x / pH: x  Gluc: 109 mg/dL / Ketone: x  / Bili: x / Urobili: x   Blood: x / Protein: x / Nitrite: x   Leuk Esterase: x / RBC: x / WBC x   Sq Epi: x / Non Sq Epi: x / Bacteria: x        RADIOLOGY & ADDITIONAL TESTS:    Imaging Personally Reviewed:    Consultant(s) Notes Reviewed:      Care Discussed with Consultants/Other Providers:

## 2024-05-29 LAB
ALBUMIN SERPL ELPH-MCNC: 3.9 G/DL — SIGNIFICANT CHANGE UP (ref 3.3–5)
ALP SERPL-CCNC: 99 U/L — SIGNIFICANT CHANGE UP (ref 40–120)
ALT FLD-CCNC: 63 U/L — HIGH (ref 10–45)
ANION GAP SERPL CALC-SCNC: 11 MMOL/L — SIGNIFICANT CHANGE UP (ref 5–17)
AST SERPL-CCNC: 59 U/L — HIGH (ref 10–40)
BASOPHILS # BLD AUTO: 0.03 K/UL — SIGNIFICANT CHANGE UP (ref 0–0.2)
BASOPHILS NFR BLD AUTO: 0.4 % — SIGNIFICANT CHANGE UP (ref 0–2)
BILIRUB SERPL-MCNC: 1 MG/DL — SIGNIFICANT CHANGE UP (ref 0.2–1.2)
BUN SERPL-MCNC: 10 MG/DL — SIGNIFICANT CHANGE UP (ref 7–23)
CALCIUM SERPL-MCNC: 9 MG/DL — SIGNIFICANT CHANGE UP (ref 8.4–10.5)
CHLORIDE SERPL-SCNC: 102 MMOL/L — SIGNIFICANT CHANGE UP (ref 96–108)
CO2 SERPL-SCNC: 24 MMOL/L — SIGNIFICANT CHANGE UP (ref 22–31)
CREAT SERPL-MCNC: 0.82 MG/DL — SIGNIFICANT CHANGE UP (ref 0.5–1.3)
EGFR: 107 ML/MIN/1.73M2 — SIGNIFICANT CHANGE UP
EOSINOPHIL # BLD AUTO: 0.29 K/UL — SIGNIFICANT CHANGE UP (ref 0–0.5)
EOSINOPHIL NFR BLD AUTO: 4.1 % — SIGNIFICANT CHANGE UP (ref 0–6)
GLUCOSE SERPL-MCNC: 96 MG/DL — SIGNIFICANT CHANGE UP (ref 70–99)
HCT VFR BLD CALC: 39 % — SIGNIFICANT CHANGE UP (ref 39–50)
HGB BLD-MCNC: 12.7 G/DL — LOW (ref 13–17)
IMM GRANULOCYTES NFR BLD AUTO: 0.3 % — SIGNIFICANT CHANGE UP (ref 0–0.9)
LYMPHOCYTES # BLD AUTO: 1.29 K/UL — SIGNIFICANT CHANGE UP (ref 1–3.3)
LYMPHOCYTES # BLD AUTO: 18.3 % — SIGNIFICANT CHANGE UP (ref 13–44)
MAGNESIUM SERPL-MCNC: 2.2 MG/DL — SIGNIFICANT CHANGE UP (ref 1.6–2.6)
MCHC RBC-ENTMCNC: 29.3 PG — SIGNIFICANT CHANGE UP (ref 27–34)
MCHC RBC-ENTMCNC: 32.6 GM/DL — SIGNIFICANT CHANGE UP (ref 32–36)
MCV RBC AUTO: 90.1 FL — SIGNIFICANT CHANGE UP (ref 80–100)
MONOCYTES # BLD AUTO: 0.64 K/UL — SIGNIFICANT CHANGE UP (ref 0–0.9)
MONOCYTES NFR BLD AUTO: 9.1 % — SIGNIFICANT CHANGE UP (ref 2–14)
NEUTROPHILS # BLD AUTO: 4.77 K/UL — SIGNIFICANT CHANGE UP (ref 1.8–7.4)
NEUTROPHILS NFR BLD AUTO: 67.8 % — SIGNIFICANT CHANGE UP (ref 43–77)
NRBC # BLD: 0 /100 WBCS — SIGNIFICANT CHANGE UP (ref 0–0)
PHOSPHATE SERPL-MCNC: 3.4 MG/DL — SIGNIFICANT CHANGE UP (ref 2.5–4.5)
PLATELET # BLD AUTO: 115 K/UL — LOW (ref 150–400)
POTASSIUM SERPL-MCNC: 4.2 MMOL/L — SIGNIFICANT CHANGE UP (ref 3.5–5.3)
POTASSIUM SERPL-SCNC: 4.2 MMOL/L — SIGNIFICANT CHANGE UP (ref 3.5–5.3)
PROT SERPL-MCNC: 6.5 G/DL — SIGNIFICANT CHANGE UP (ref 6–8.3)
RBC # BLD: 4.33 M/UL — SIGNIFICANT CHANGE UP (ref 4.2–5.8)
RBC # FLD: 13.1 % — SIGNIFICANT CHANGE UP (ref 10.3–14.5)
SODIUM SERPL-SCNC: 137 MMOL/L — SIGNIFICANT CHANGE UP (ref 135–145)
WBC # BLD: 7.04 K/UL — SIGNIFICANT CHANGE UP (ref 3.8–10.5)
WBC # FLD AUTO: 7.04 K/UL — SIGNIFICANT CHANGE UP (ref 3.8–10.5)

## 2024-05-29 PROCEDURE — 99232 SBSQ HOSP IP/OBS MODERATE 35: CPT | Mod: GC

## 2024-05-29 PROCEDURE — 99232 SBSQ HOSP IP/OBS MODERATE 35: CPT

## 2024-05-29 RX ORDER — NIFEDIPINE 30 MG
30 TABLET, EXTENDED RELEASE 24 HR ORAL ONCE
Refills: 0 | Status: COMPLETED | OUTPATIENT
Start: 2024-05-29 | End: 2024-05-29

## 2024-05-29 RX ORDER — NIFEDIPINE 30 MG
60 TABLET, EXTENDED RELEASE 24 HR ORAL DAILY
Refills: 0 | Status: DISCONTINUED | OUTPATIENT
Start: 2024-05-30 | End: 2024-05-30

## 2024-05-29 RX ADMIN — Medication 1 TABLET(S): at 11:40

## 2024-05-29 RX ADMIN — LEVETIRACETAM 500 MILLIGRAM(S): 250 TABLET, FILM COATED ORAL at 06:01

## 2024-05-29 RX ADMIN — Medication 2 SPRAY(S): at 15:15

## 2024-05-29 RX ADMIN — Medication 1 SPRAY(S): at 11:39

## 2024-05-29 RX ADMIN — Medication 100 MILLIGRAM(S): at 06:00

## 2024-05-29 RX ADMIN — Medication 30 MILLIGRAM(S): at 08:35

## 2024-05-29 RX ADMIN — CHLORHEXIDINE GLUCONATE 1 APPLICATION(S): 213 SOLUTION TOPICAL at 11:39

## 2024-05-29 RX ADMIN — LIDOCAINE 1 PATCH: 4 CREAM TOPICAL at 19:00

## 2024-05-29 RX ADMIN — ENOXAPARIN SODIUM 40 MILLIGRAM(S): 100 INJECTION SUBCUTANEOUS at 06:01

## 2024-05-29 RX ADMIN — LEVETIRACETAM 500 MILLIGRAM(S): 250 TABLET, FILM COATED ORAL at 17:01

## 2024-05-29 RX ADMIN — LIDOCAINE 1 PATCH: 4 CREAM TOPICAL at 09:35

## 2024-05-29 RX ADMIN — Medication 2 SPRAY(S): at 11:39

## 2024-05-29 RX ADMIN — Medication 30 MILLIGRAM(S): at 06:00

## 2024-05-29 RX ADMIN — Medication 100 MILLIGRAM(S): at 13:35

## 2024-05-29 RX ADMIN — ATORVASTATIN CALCIUM 80 MILLIGRAM(S): 80 TABLET, FILM COATED ORAL at 21:21

## 2024-05-29 RX ADMIN — Medication 1 MILLIGRAM(S): at 11:40

## 2024-05-29 RX ADMIN — Medication 2 SPRAY(S): at 21:21

## 2024-05-29 RX ADMIN — Medication 100 MILLIGRAM(S): at 21:21

## 2024-05-29 RX ADMIN — LIDOCAINE 1 PATCH: 4 CREAM TOPICAL at 21:30

## 2024-05-29 RX ADMIN — Medication 1000 UNIT(S): at 11:41

## 2024-05-29 RX ADMIN — Medication 2 SPRAY(S): at 07:54

## 2024-05-29 RX ADMIN — LOSARTAN POTASSIUM 100 MILLIGRAM(S): 100 TABLET, FILM COATED ORAL at 06:00

## 2024-05-29 NOTE — PROGRESS NOTE ADULT - ATTENDING COMMENTS
48 M current smoker and EtOH use who presented to the ED after witnessed seizure like activity  No fevers, no leukocytosis  CT A/P no intra-abd infection  CT vasogenic edema, temporal lesion, periapical abscess, acute on chronic sinusitis  F/U BCXs  UA neg  Unclear significance brain lesions; periapical abscess noted  Overall, Seizure, abscess, brain lesion  - Monitor off abx  - F/U pending BCXs  - Note Dental eval  - Monitor for focal signs infection  - Care for hematoma/seizure per team    My colleagues will be covering this patient on 5/30, I will return 5/31. Please call [phone] or on call fellow with any questions or change in status.     Jose D Bond MD  Contact on TEAMS messaging from 9am - 5pm  From 5pm-9am, on weekends, or if no response call [phone]    I was physically present for the key portions of the evaluation and management service provided. I saw and examined the patient. I agree with the above history, physical, and plan except for any discrepancies which I have documented in “Attending Statements.” Please refer to “Attending Statements” for final plan.

## 2024-05-29 NOTE — PROGRESS NOTE ADULT - SUBJECTIVE AND OBJECTIVE BOX
DATE OF SERVICE: 05-29-24 @ 09:21    Patient is a 48y old  Male who presents with a chief complaint of Seizure, brain mass (28 May 2024 12:50)      SUBJECTIVE / OVERNIGHT EVENTS:  (Chelsea [id number]). No acute events overnight. vEEG over past 24 hours with no recorded seizure activity, will remove today. Patient pending TTE. Monitoring off antibiotics. Increased nifedipine to 60mg daily.     MEDICATIONS  (STANDING):  atorvastatin 80 milliGRAM(s) Oral at bedtime  benzonatate 100 milliGRAM(s) Oral three times a day  chlorhexidine 2% Cloths 1 Application(s) Topical daily  cholecalciferol 1000 Unit(s) Oral daily  enoxaparin Injectable 40 milliGRAM(s) SubCutaneous every 24 hours  fluticasone propionate 50 MICROgram(s)/spray Nasal Spray 1 Spray(s) Both Nostrils <User Schedule>  folic acid 1 milliGRAM(s) Oral daily  lactated ringers. 1000 milliLiter(s) (75 mL/Hr) IV Continuous <Continuous>  levETIRAcetam 500 milliGRAM(s) Oral two times a day  lidocaine   4% Patch 1 Patch Transdermal every 24 hours  losartan 100 milliGRAM(s) Oral daily  multivitamin 1 Tablet(s) Oral daily  sodium chloride 0.65% Nasal 2 Spray(s) Both Nostrils five times a day    MEDICATIONS  (PRN):  acetaminophen     Tablet .. 650 milliGRAM(s) Oral every 6 hours PRN Mild Pain (1 - 3)  guaiFENesin  milliGRAM(s) Oral every 12 hours PRN Cough  hydrALAZINE Injectable 10 milliGRAM(s) IV Push every 4 hours PRN SBP>180      Vital Signs Last 24 Hrs  T(C): 36.8 (29 May 2024 04:00), Max: 37 (29 May 2024 01:35)  T(F): 98.3 (29 May 2024 04:00), Max: 98.6 (29 May 2024 01:35)  HR: 92 (29 May 2024 08:32) (71 - 92)  BP: 184/122 (29 May 2024 08:32) (138/69 - 190/115)  BP(mean): --  RR: 18 (29 May 2024 08:32) (18 - 18)  SpO2: 97% (29 May 2024 04:00) (95% - 99%)    Parameters below as of 29 May 2024 04:00  Patient On (Oxygen Delivery Method): room air      CAPILLARY BLOOD GLUCOSE        I&O's Summary    28 May 2024 07:01  -  29 May 2024 07:00  --------------------------------------------------------  IN: 480 mL / OUT: 1200 mL / NET: -720 mL        PHYSICAL EXAM:  GENERAL: NAD, well-developed  HEAD:  Atraumatic, Normocephalic  EYES: EOMI, PERRLA, medial pterygium on both eyes  NECK: Supple, No JVD  CHEST/LUNG: Clear to auscultation bilaterally; No wheeze, coughing   HEART: Regular rate and rhythm; No murmurs  ABDOMEN: Soft, Nontender, Nondistended  EXTREMITIES:  2+ Peripheral Pulses, No edema, clubbing present   PSYCH: AAOx3  NEUROLOGY: non-focal  SKIN: No rashes or lesions  LABS:                        12.7   7.04  )-----------( 115      ( 29 May 2024 06:49 )             39.0     05-29    137  |  102  |  10  ----------------------------<  96  4.2   |  24  |  0.82    Ca    9.0      29 May 2024 06:49  Phos  3.4     05-29  Mg     2.2     05-29    TPro  6.5  /  Alb  3.9  /  TBili  1.0  /  DBili  x   /  AST  59<H>  /  ALT  63<H>  /  AlkPhos  99  05-29      CARDIAC MARKERS ( 28 May 2024 05:16 )  x     / x     / 457 U/L / x     / x          Urinalysis Basic - ( 29 May 2024 06:49 )    Color: x / Appearance: x / SG: x / pH: x  Gluc: 96 mg/dL / Ketone: x  / Bili: x / Urobili: x   Blood: x / Protein: x / Nitrite: x   Leuk Esterase: x / RBC: x / WBC x   Sq Epi: x / Non Sq Epi: x / Bacteria: x        RADIOLOGY & ADDITIONAL TESTS:    Imaging Personally Reviewed:    Consultant(s) Notes Reviewed:      Care Discussed with Consultants/Other Providers:

## 2024-05-29 NOTE — PROGRESS NOTE ADULT - ATTENDING COMMENTS
-BP overall better controlled on increased nifedipine and losartan, however meds need more time to work.   -He is uninsured but needs to stay on BP meds and f/u with outpatient medical clinic. Would consult with SW to see if can assist with patient getting meds and outpt f/u.   -Plan for DC tomorrow if BP still better controlled and meds can be obtained.   -D/w house staff.

## 2024-05-29 NOTE — PROVIDER CONTACT NOTE (OTHER) - RECOMMENDATIONS
notify provider
C/w medication regimen & reassess BP.
Give PRN Hydralazine per orders.
notify provider
Provider to be made aware

## 2024-05-29 NOTE — PROGRESS NOTE ADULT - PROBLEM SELECTOR PLAN 5
- Outpatient consult with [facility] for tongue lesions for possible [phone].  - Dental F/U with outpatient dentist for extraction of root tips and comprehensive dental care.

## 2024-05-29 NOTE — PROVIDER CONTACT NOTE (OTHER) - SITUATION
Patient /122 this morning @0830. Patient asymptomatic at this time.
/105
Patient BP: 191/111
/111
Pt /94 HR 74

## 2024-05-29 NOTE — PROGRESS NOTE ADULT - PROBLEM SELECTOR PLAN 4
- Pt drinks 1L of whiskey? nightly before bed. Unclear if he develops withdrawal seizures or DTs  - Last drink 5/24 evening?  - Monitor on CIWA - d/c 5/28   - Thiamine (high dose), folate, multivitamin

## 2024-05-29 NOTE — PROVIDER CONTACT NOTE (OTHER) - BACKGROUND
Pt admitted for other conditions of brain
admit dx: mass of temporal lobe. Alcohol abuse, CIWA.
47 y/o male with PMHX HTN, smoker, alcohol use disorder who presents after witnessed syncope/collapse episode with seizure like activity. Found to have brain lesion.
Admit Dx: mass of temporal lobe, CIWA.
49 y/o male with PMHX HTN, smoker, alcohol use disorder who presents after witnessed syncope/collapse episode with seizure like activity. Found to have brain lesion.

## 2024-05-29 NOTE — PROGRESS NOTE ADULT - PROBLEM SELECTOR PLAN 1
- CT H with low-density lesion is noted in the left middle temporal gyrus, with surrounding vasogenic edema. This measures approximately 2.5 x 1.1 x 2.3cm. Differential includes malignancy vs infection vs ischemic event, but given sinus abscesses would treat as a brain abscess  - Vancomycin (5/26 -5/28 )  - Ceftriaxone 2g q12 hrs (5/26 -5/28 )  - Metronidazole 500 q8 hrs (5/26 - 5/28)  - Ordered MR brain - L mid posterior temporal lobe hematoma with small surrounding edema/gliosis, no midline shift   - F/u blood cultures (5/26 - ) - No growth to date   - NSX following - outpatient follow up in 1-2 weeks  - ID following - monitor off abx

## 2024-05-29 NOTE — PROVIDER CONTACT NOTE (OTHER) - ACTION/TREATMENT ORDERED:
Provider aware, no further interventions at this time
Provider ordered a 1 time dose of 30 mg nifedipine & starting patient on 60 mg nifedipine tomorrow. Will reassess BP. All safety measures maintained, plan of care ongoing, will continue to monitor.
provider made aware, give PRN hydralazine as ordered
provider made aware, give hydralazine as ordered
Per provider, give PRN hydralazine & repeat within 30 minutes
no

## 2024-05-29 NOTE — PROVIDER CONTACT NOTE (OTHER) - ASSESSMENT
Pt is A&Ox4, Yakut speaking. Pt upon admission /109 In ED. Pt /94 HR 74 NSR.
/105, all other VSS. no complaints of headache, SOB, pain, dizziness, n/v.
Patient /122 this morning @0830. Patient asymptomatic at this time.
/111, HR 73, denies CP, headache, n/v, dizziness, SOB.
Patient BP: 191/111. Patient denies headache, asymptomatic on assessment.

## 2024-05-29 NOTE — EEG REPORT - NS EEG TEXT BOX
REPORT OF CONTINUOUS VIDEO EEG      [facility]: [address], [place] [address], Phone: [phone]  [facility]: [id number], [place] [address], Phone: [phone]  [facility]: [address] [place] [address], Phone: [phone]    Patient Name: Marco Hirsch    Age: 48 year, : 1975  MRN #: -, Smith: -[facility] [id number]  Referring Physician: -  EEG #: [id number]    Study Date: 2024   Start Time: 12:09:01 PM      End Date: 2024         End Time: 08:00:04 AM     Study Duration: 19 h 48 m    Study Information:    EEG Recording Technique:  The patient underwent continuous Video-EEG monitoring, using Telemetry System hardware on the XLTek Digital System. EEG and video data were stored on a computer hard drive with important events saved in digital archive files. The material was reviewed by a physician (electroencephalographer / epileptologist) on a daily basis. Edenilson and seizure detection algorithms were utilized and reviewed. An EEG Technician attended to the patient, and was available throughout daytime work hours.  The epilepsy center neurologist was available in person or on call 24-hours per day.    EEG Placement and Labeling of Electrodes:  The EEG was performed utilizing 20 channel referential EEG connections (coronal over temporal over parasagittal montage) using all standard 10-20 electrode placements with EKG, with additional electrodes placed in the inferior temporal region using the modified 10-10 montage electrode placements for elective admissions, or if deemed necessary. Recording was at a sampling rate of 256 samples per second per channel. Time synchronized digital video recording was done simultaneously with EEG recording. A low light infrared camera was used for low light recording.     History: -  48 year old Male is here to rule out seizures    Medication  Ativan (Lorazepam)    Keppra (Levetiracetam)    Lipitor    Cozaar (Anti-hypertensive Drug)      Interpretation:    [[[Abbreviation Key:  PDR=alpha rhythm/posterior dominant rhythm. A-P=anterior posterior.  Amplitude: ‘very low’:<20; ‘low’:20-49; ‘medium’:; ‘high’:>150uV.  Persistence for periodic/rhythmic patterns (% of epoch) ‘rare’:<1%; ‘occasional’:1-10%; ‘frequent’:10-50%; ‘abundant’:50-90%; ‘continuous’:>90%.  Persistence for sporadic discharges: ‘rare’:<1/hr; ‘occasional’:1/min-1/hr; ‘frequent’:>1/min; ‘abundant’:>1/10 sec.  RPP=rhythmic and periodic patterns; GRDA=generalized rhythmic delta activity; FIRDA=frontal intermittent GRDA; LRDA=lateralized rhythmic delta activity; TIRDA=temporal intermittent rhythmic delta activity;  LPD=PLED=lateralized periodic discharges; GPD=generalized periodic discharges; BIPDs =bilateral independent periodic discharges; Mf=multifocal; SIRPDs=stimulus induced rhythmic, periodic, or ictal appearing discharges; BIRDs=brief potentially ictal rhythmic discharges >4 Hz, lasting .5-10s; PFA (paroxysmal bursts >13 Hz or =8 Hz <10s).  Modifiers: +F=with fast component; +S=with spike component; +R=with rhythmic component.  S-B=burst suppression pattern.  Max=maximal. N1-drowsy; N2-stage II sleep; N3-slow wave sleep. SSS/BETS=small sharp spikes/benign epileptiform transients of sleep. HV=hyperventilation; PS=photic stimulation]]]      Daily EEG Visual Analysis    FINDINGS:      Background:  Symmetry: symmetric  Continuous: continuous  PDR: symmetric, well-modulated 9 Hz activity, with amplitude to 40 uV, that attenuated to eye opening.  Low amplitude frontal beta noted in wakefulness.  Reactivity: present  Voltage: normal, [defined typically between 20-150uV]  Anterior Posterior Gradient: present  Breach: absent    Background Slowing:  Generalized slowing: none was present.  Focal slowing: none was present.    State Changes:   -Drowsiness was characterized by fragmentation, attenuation, and slowing of the background activity.    -N2 sleep transients with symmetric spindles and K-complexes.    Sporadic Epileptiform Discharges:   None    Rhythmic and Periodic Patterns (RPPs):  None     Electrographic and Electroclinical seizures:  None    Other Clinical Events:  None    Activation Procedures:   -Hyperventilation was not performed.    -Photic stimulation was not performed.      Artifacts:  Intermittent myogenic and movement artifacts were noted.    ECG:  No significant abnormality      EEG Summary / Classification:  Normal in the awake / drowsy / asleep states.  •	    EEG Impression / Clinical Correlate:  Normal prolonged EEG study.  No epileptic discharges recorded.  No seizures recorded.  •	    ________________________________________    NICK Joel  Attending Physician, [facility]ile[facility] [place]    ------------------------------------  EEG Reading Room: [phone]  On Call Service After Hours: [phone]      REPORT OF CONTINUOUS VIDEO EEG      [facility]: [address], [place] [address], Phone: [phone]  [facility]: [address], [place] [address], Phone: [phone]  [facility]: [address] [place] [address], Phone: [phone]    Patient Name: Marco Hirsch    Age: 48 year, : 1975  MRN #: -, Smith: -[facility] [id number]  Referring Physician: -  EEG #: [id number]    Study Date: 2024   Start Time: 12:09:01 PM      End Date: 2024         End Time: 11:00  Study Duration: 22 h 48 m    Study Information:    EEG Recording Technique:  The patient underwent continuous Video-EEG monitoring, using Telemetry System hardware on the XLTek Digital System. EEG and video data were stored on a computer hard drive with important events saved in digital archive files. The material was reviewed by a physician (electroencephalographer / epileptologist) on a daily basis. Edenilson and seizure detection algorithms were utilized and reviewed. An EEG Technician attended to the patient, and was available throughout daytime work hours.  The epilepsy center neurologist was available in person or on call 24-hours per day.    EEG Placement and Labeling of Electrodes:  The EEG was performed utilizing 20 channel referential EEG connections (coronal over temporal over parasagittal montage) using all standard 10-20 electrode placements with EKG, with additional electrodes placed in the inferior temporal region using the modified 10-10 montage electrode placements for elective admissions, or if deemed necessary. Recording was at a sampling rate of 256 samples per second per channel. Time synchronized digital video recording was done simultaneously with EEG recording. A low light infrared camera was used for low light recording.     History: -  48 year old Male is here to rule out seizures    Medication  Ativan (Lorazepam)    Keppra (Levetiracetam)    Lipitor    Cozaar (Anti-hypertensive Drug)      Interpretation:    [[[Abbreviation Key:  PDR=alpha rhythm/posterior dominant rhythm. A-P=anterior posterior.  Amplitude: ‘very low’:<20; ‘low’:20-49; ‘medium’:; ‘high’:>150uV.  Persistence for periodic/rhythmic patterns (% of epoch) ‘rare’:<1%; ‘occasional’:1-10%; ‘frequent’:10-50%; ‘abundant’:50-90%; ‘continuous’:>90%.  Persistence for sporadic discharges: ‘rare’:<1/hr; ‘occasional’:1/min-1/hr; ‘frequent’:>1/min; ‘abundant’:>1/10 sec.  RPP=rhythmic and periodic patterns; GRDA=generalized rhythmic delta activity; FIRDA=frontal intermittent GRDA; LRDA=lateralized rhythmic delta activity; TIRDA=temporal intermittent rhythmic delta activity;  LPD=PLED=lateralized periodic discharges; GPD=generalized periodic discharges; BIPDs =bilateral independent periodic discharges; Mf=multifocal; SIRPDs=stimulus induced rhythmic, periodic, or ictal appearing discharges; BIRDs=brief potentially ictal rhythmic discharges >4 Hz, lasting .5-10s; PFA (paroxysmal bursts >13 Hz or =8 Hz <10s).  Modifiers: +F=with fast component; +S=with spike component; +R=with rhythmic component.  S-B=burst suppression pattern.  Max=maximal. N1-drowsy; N2-stage II sleep; N3-slow wave sleep. SSS/BETS=small sharp spikes/benign epileptiform transients of sleep. HV=hyperventilation; PS=photic stimulation]]]      Daily EEG Visual Analysis    FINDINGS:      Background:  Symmetry: symmetric  Continuous: continuous  PDR: symmetric, well-modulated 9 Hz activity, with amplitude to 40 uV, that attenuated to eye opening.  Low amplitude frontal beta noted in wakefulness.  Reactivity: present  Voltage: normal, [defined typically between 20-150uV]  Anterior Posterior Gradient: present  Breach: absent    Background Slowing:  Generalized slowing: none was present.  Focal slowing: none was present.    State Changes:   -Drowsiness was characterized by fragmentation, attenuation, and slowing of the background activity.    -N2 sleep transients with symmetric spindles and K-complexes.    Sporadic Epileptiform Discharges:   None    Rhythmic and Periodic Patterns (RPPs):  None     Electrographic and Electroclinical seizures:  None    Other Clinical Events:  None    Activation Procedures:   -Hyperventilation was not performed.    -Photic stimulation was not performed.      Artifacts:  Intermittent myogenic and movement artifacts were noted.    ECG:  No significant abnormality      EEG Summary / Classification:  Normal in the awake / drowsy / asleep states.  •	    EEG Impression / Clinical Correlate:  Normal prolonged EEG study.  No epileptic discharges recorded.  No seizures recorded.  •	    ________________________________________    NICK Joel  Attending Physician, [facility]ile[facility] [place]    ------------------------------------  EEG Reading Room: [phone]  On Call Service After Hours: [phone]

## 2024-05-29 NOTE — PROGRESS NOTE ADULT - PROBLEM SELECTOR PLAN 6
- Pt with BP's >200>110's in ED. Asymptomatic  - Start losartan 25mg qd to bring down pressures slowly. Increase to 50mg qdaily (5/28 -   - Start nifedipine 30mg qdaily - increased to 60mg (5/29 -   - PRN hydralazine for Systolic >180

## 2024-05-29 NOTE — PROGRESS NOTE ADULT - SUBJECTIVE AND OBJECTIVE BOX
Follow Up:      Interval History/ROS:Patient is a 48y old  Male who presents with a chief complaint of Seizure, brain mass (29 May 2024 09:21)      REVIEW OF SYSTEMS  [  ] ROS unobtainable because:    [ x ] All other systems negative except as noted below    Constitutional:  [ ] fever [ ] chills  [ ] weight loss  [ ]night sweat  [ ]poor appetite/PO intake [ ]fatigue   Skin:  [ ] rash [ ] phlebitis	  Eyes: [ ] icterus [ ] pain  [ ] discharge	  ENMT: [ ] sore throat  [ ] thrush [ ] ulcers [ ] exudates [ ]anosmia  Respiratory: [ ] dyspnea [ ] hemoptysis [ ] cough [ ] sputum	  Cardiovascular:  [ ] chest pain [ ] palpitations [ ] edema	  Gastrointestinal:  [ ] nausea [ ] vomiting [ ] diarrhea [ ] constipation [ ] pain	  Genitourinary:  [ ] dysuria [ ] frequency [ ] hematuria [ ] discharge [ ] flank pain  [ ] incontinence  Musculoskeletal:  [ ] myalgias [ ] arthralgias [ ] arthritis  [ ] back pain  Neurological:  [ ] headache [ ] weakness [ ] seizures  [ ] confusion/altered mental status    Allergies  No Known Allergies        ANTIMICROBIALS:          OTHER MEDS: MEDICATIONS  (STANDING):  acetaminophen     Tablet .. 650 every 6 hours PRN  atorvastatin 80 at bedtime  benzonatate 100 three times a day  enoxaparin Injectable 40 every 24 hours  guaiFENesin  every 12 hours PRN  hydrALAZINE Injectable 10 every 4 hours PRN  levETIRAcetam 500 two times a day  losartan 100 daily      Vital Signs Last 24 Hrs  T(F): 98.3 (05-29-24 @ 11:31), Max: 99.7 (05-26-24 @ 01:13)    Vital Signs Last 24 Hrs  HR: 80 (05-29-24 @ 11:31) (75 - 92)  BP: 166/83 (05-29-24 @ 11:31) (138/69 - 184/122)  RR: 18 (05-29-24 @ 11:31)  SpO2: 98% (05-29-24 @ 11:31) (95% - 98%)  Wt(kg): --    EXAM:            Labs:                        12.7   7.04  )-----------( 115      ( 29 May 2024 06:49 )             39.0     05-29    137  |  102  |  10  ----------------------------<  96  4.2   |  24  |  0.82    Ca    9.0      29 May 2024 06:49  Phos  3.4     05-29  Mg     2.2     05-29    TPro  6.5  /  Alb  3.9  /  TBili  1.0  /  DBili  x   /  AST  59<H>  /  ALT  63<H>  /  AlkPhos  99  05-29      WBC Trend:  WBC Count: 7.04 (05-29-24 @ 06:49)  WBC Count: 5.57 (05-28-24 @ 05:16)  WBC Count: 4.85 (05-27-24 @ 06:58)  WBC Count: 5.95 (05-26-24 @ 06:36)      Creatine Trend:  Creatinine: 0.82 (05-29)  Creatinine: 0.86 (05-28)  Creatinine: 0.88 (05-27)  Creatinine: 0.93 (05-26)      Liver Biochemical Testing Trend:  Alanine Aminotransferase (ALT/SGPT): 63 *H* (05-29)  Alanine Aminotransferase (ALT/SGPT): 46 *H* (05-28)  Alanine Aminotransferase (ALT/SGPT): 41 (05-27)  Alanine Aminotransferase (ALT/SGPT): 47 *H* (05-26)  Alanine Aminotransferase (ALT/SGPT): 52 *H* (05-25)  Aspartate Aminotransferase (AST/SGOT): 59 (05-29-24 @ 06:49)  Aspartate Aminotransferase (AST/SGOT): 46 (05-28-24 @ 05:16)  Aspartate Aminotransferase (AST/SGOT): 47 (05-27-24 @ 06:59)  Aspartate Aminotransferase (AST/SGOT): 65 (05-26-24 @ 06:36)  Aspartate Aminotransferase (AST/SGOT): 61 (05-25-24 @ 14:27)  Bilirubin Total: 1.0 (05-29)  Bilirubin Total: 0.8 (05-28)  Bilirubin Total: 1.3 (05-27)  Bilirubin Total: 1.6 (05-26)  Bilirubin Total: 1.3 (05-25)      Trend LDH      Urinalysis Basic - ( 29 May 2024 06:49 )    Color: x / Appearance: x / SG: x / pH: x  Gluc: 96 mg/dL / Ketone: x  / Bili: x / Urobili: x   Blood: x / Protein: x / Nitrite: x   Leuk Esterase: x / RBC: x / WBC x   Sq Epi: x / Non Sq Epi: x / Bacteria: x        MICROBIOLOGY:  Vancomycin Level, Trough: 12.0 (05-28 @ 05:16)    MRSA PCR Result.: NotDetec (05-26-24 @ 06:37)      Culture - Blood (collected 26 May 2024 01:33)  Source: .Blood Blood-Peripheral  Preliminary Report:    No growth at 72 Hours    Culture - Blood (collected 26 May 2024 01:33)  Source: .Blood Blood-Peripheral  Preliminary Report:    No growth at 72 Hours      HIV-1/2 Combo Result: Nonreact (05-26-24 @ 01:42)  HIV-1/2 Combo Result: Nonreact (05-25-24 @ 16:06)        Troponin T, High Sensitivity Result: 38 (05-26)  Troponin T, High Sensitivity Result: 28 (05-25)                   Follow Up:      Interval History/ROS: Afebrile overnight. No complaints.       REVIEW OF SYSTEMS  [  ] ROS unobtainable because:    [ x ] All other systems negative except as noted below    Constitutional:  [ ] fever [ ] chills  [ ] weight loss  [ ]night sweat  [ ]poor appetite/PO intake [ ]fatigue   Skin:  [ ] rash [ ] phlebitis	  Eyes: [ ] icterus [ ] pain  [ ] discharge	  ENMT: [ ] sore throat  [ ] thrush [ ] ulcers [ ] exudates [ ]anosmia  Respiratory: [ ] dyspnea [ ] hemoptysis [ ] cough [ ] sputum	  Cardiovascular:  [ ] chest pain [ ] palpitations [ ] edema	  Gastrointestinal:  [ ] nausea [ ] vomiting [ ] diarrhea [ ] constipation [ ] pain	  Genitourinary:  [ ] dysuria [ ] frequency [ ] hematuria [ ] discharge [ ] flank pain  [ ] incontinence  Musculoskeletal:  [ ] myalgias [ ] arthralgias [ ] arthritis  [ ] back pain  Neurological:  [ ] headache [ ] weakness [ ] seizures  [ ] confusion/altered mental status    Allergies  No Known Allergies        ANTIMICROBIALS:          OTHER MEDS: MEDICATIONS  (STANDING):  acetaminophen     Tablet .. 650 every 6 hours PRN  atorvastatin 80 at bedtime  benzonatate 100 three times a day  enoxaparin Injectable 40 every 24 hours  guaiFENesin  every 12 hours PRN  hydrALAZINE Injectable 10 every 4 hours PRN  levETIRAcetam 500 two times a day  losartan 100 daily      Vital Signs Last 24 Hrs  T(F): 98.3 (05-29-24 @ 11:31), Max: 99.7 (05-26-24 @ 01:13)    Vital Signs Last 24 Hrs  HR: 80 (05-29-24 @ 11:31) (75 - 92)  BP: 166/83 (05-29-24 @ 11:31) (138/69 - 184/122)  RR: 18 (05-29-24 @ 11:31)  SpO2: 98% (05-29-24 @ 11:31) (95% - 98%)  Wt(kg): --    EXAM:    Constitutional:  well preserved, comfortable  Head/Eyes: no icterus  LUNGS:  CTA  CVS:  regular rhythm  Abd:  soft, non-tender; non-distended  Ext:  no edema  Vascular:  IV site no erythema tenderness or discharge  Neuro: AAO X 3, non- focal        Labs:                        12.7   7.04  )-----------( 115      ( 29 May 2024 06:49 )             39.0     05-29    137  |  102  |  10  ----------------------------<  96  4.2   |  24  |  0.82    Ca    9.0      29 May 2024 06:49  Phos  3.4     05-29  Mg     2.2     05-29    TPro  6.5  /  Alb  3.9  /  TBili  1.0  /  DBili  x   /  AST  59<H>  /  ALT  63<H>  /  AlkPhos  99  05-29      WBC Trend:  WBC Count: 7.04 (05-29-24 @ 06:49)  WBC Count: 5.57 (05-28-24 @ 05:16)  WBC Count: 4.85 (05-27-24 @ 06:58)  WBC Count: 5.95 (05-26-24 @ 06:36)      Creatine Trend:  Creatinine: 0.82 (05-29)  Creatinine: 0.86 (05-28)  Creatinine: 0.88 (05-27)  Creatinine: 0.93 (05-26)      Liver Biochemical Testing Trend:  Alanine Aminotransferase (ALT/SGPT): 63 *H* (05-29)  Alanine Aminotransferase (ALT/SGPT): 46 *H* (05-28)  Alanine Aminotransferase (ALT/SGPT): 41 (05-27)  Alanine Aminotransferase (ALT/SGPT): 47 *H* (05-26)  Alanine Aminotransferase (ALT/SGPT): 52 *H* (05-25)  Aspartate Aminotransferase (AST/SGOT): 59 (05-29-24 @ 06:49)  Aspartate Aminotransferase (AST/SGOT): 46 (05-28-24 @ 05:16)  Aspartate Aminotransferase (AST/SGOT): 47 (05-27-24 @ 06:59)  Aspartate Aminotransferase (AST/SGOT): 65 (05-26-24 @ 06:36)  Aspartate Aminotransferase (AST/SGOT): 61 (05-25-24 @ 14:27)  Bilirubin Total: 1.0 (05-29)  Bilirubin Total: 0.8 (05-28)  Bilirubin Total: 1.3 (05-27)  Bilirubin Total: 1.6 (05-26)  Bilirubin Total: 1.3 (05-25)      Trend LDH      Urinalysis Basic - ( 29 May 2024 06:49 )    Color: x / Appearance: x / SG: x / pH: x  Gluc: 96 mg/dL / Ketone: x  / Bili: x / Urobili: x   Blood: x / Protein: x / Nitrite: x   Leuk Esterase: x / RBC: x / WBC x   Sq Epi: x / Non Sq Epi: x / Bacteria: x        MICROBIOLOGY:  Vancomycin Level, Trough: 12.0 (05-28 @ 05:16)    MRSA PCR Result.: NotDetec (05-26-24 @ 06:37)      Culture - Blood (collected 26 May 2024 01:33)  Source: .Blood Blood-Peripheral  Preliminary Report:    No growth at 72 Hours    Culture - Blood (collected 26 May 2024 01:33)  Source: .Blood Blood-Peripheral  Preliminary Report:    No growth at 72 Hours      HIV-1/2 Combo Result: Nonreact (05-26-24 @ 01:42)  HIV-1/2 Combo Result: Nonreact (05-25-24 @ 16:06)        Troponin T, High Sensitivity Result: 38 (05-26)  Troponin T, High Sensitivity Result: 28 (05-25)

## 2024-05-29 NOTE — PROGRESS NOTE ADULT - ASSESSMENT
WORK UP:      ANTIBIOTIC:      IMPRESSION:        SUGGESTIONS:        Above recommendations are Preliminary until Attending's Addendum which includes Final Recommendations      Lico Dickens DO, PGY-5   ID fellow  [employer] Teams Preferred  After 5pm/weekends call [phone]   48 M current smoker and EtOH use who presented to the ED after witnessed seizure like activity  No fevers, no leukocytosis  CT A/P no intra-abd infection  CT vasogenic edema, temporal lesion, periapical abscess, acute on chronic sinusitis  UA neg  MRI with L posterior temporal lobe hematoma, no infection or neoplasm  BCX NGTD    IMPRESSION:  Overall, Seizure, abscess, brain lesion  - Unclear significance brain lesions; periapical abscess noted  - Dental eval appreciated, no acute findings      SUGGESTIONS:  - monitor off abx  - F/U pending BCXs  - Monitor for focal signs infection  - Care for hematoma/seizure per team        Above recommendations are Preliminary until Attending's Addendum which includes Final Recommendations      Lico Dickens DO, PGY-5   ID fellow  [employer] Teams Preferred  After 5pm/weekends call [phone]

## 2024-05-30 VITALS
HEART RATE: 80 BPM | OXYGEN SATURATION: 99 % | SYSTOLIC BLOOD PRESSURE: 154 MMHG | DIASTOLIC BLOOD PRESSURE: 84 MMHG | TEMPERATURE: 98 F | RESPIRATION RATE: 18 BRPM

## 2024-05-30 LAB
ALBUMIN SERPL ELPH-MCNC: 3.8 G/DL — SIGNIFICANT CHANGE UP (ref 3.3–5)
ALP SERPL-CCNC: 119 U/L — SIGNIFICANT CHANGE UP (ref 40–120)
ALT FLD-CCNC: 60 U/L — HIGH (ref 10–45)
ANION GAP SERPL CALC-SCNC: 13 MMOL/L — SIGNIFICANT CHANGE UP (ref 5–17)
AST SERPL-CCNC: 43 U/L — HIGH (ref 10–40)
BASOPHILS # BLD AUTO: 0.04 K/UL — SIGNIFICANT CHANGE UP (ref 0–0.2)
BASOPHILS NFR BLD AUTO: 0.5 % — SIGNIFICANT CHANGE UP (ref 0–2)
BILIRUB SERPL-MCNC: 0.7 MG/DL — SIGNIFICANT CHANGE UP (ref 0.2–1.2)
BUN SERPL-MCNC: 15 MG/DL — SIGNIFICANT CHANGE UP (ref 7–23)
CALCIUM SERPL-MCNC: 8.9 MG/DL — SIGNIFICANT CHANGE UP (ref 8.4–10.5)
CHLORIDE SERPL-SCNC: 102 MMOL/L — SIGNIFICANT CHANGE UP (ref 96–108)
CO2 SERPL-SCNC: 24 MMOL/L — SIGNIFICANT CHANGE UP (ref 22–31)
CREAT SERPL-MCNC: 0.83 MG/DL — SIGNIFICANT CHANGE UP (ref 0.5–1.3)
EGFR: 107 ML/MIN/1.73M2 — SIGNIFICANT CHANGE UP
EOSINOPHIL # BLD AUTO: 0.27 K/UL — SIGNIFICANT CHANGE UP (ref 0–0.5)
EOSINOPHIL NFR BLD AUTO: 3.7 % — SIGNIFICANT CHANGE UP (ref 0–6)
GAMMA INTERFERON BACKGROUND BLD IA-ACNC: 0.02 IU/ML — SIGNIFICANT CHANGE UP
GLUCOSE SERPL-MCNC: 112 MG/DL — HIGH (ref 70–99)
HCT VFR BLD CALC: 39.5 % — SIGNIFICANT CHANGE UP (ref 39–50)
HGB BLD-MCNC: 13.2 G/DL — SIGNIFICANT CHANGE UP (ref 13–17)
IMM GRANULOCYTES NFR BLD AUTO: 0.4 % — SIGNIFICANT CHANGE UP (ref 0–0.9)
LYMPHOCYTES # BLD AUTO: 1.25 K/UL — SIGNIFICANT CHANGE UP (ref 1–3.3)
LYMPHOCYTES # BLD AUTO: 17.1 % — SIGNIFICANT CHANGE UP (ref 13–44)
M TB IFN-G BLD-IMP: NEGATIVE — SIGNIFICANT CHANGE UP
M TB IFN-G CD4+ BCKGRND COR BLD-ACNC: 0.05 IU/ML — SIGNIFICANT CHANGE UP
M TB IFN-G CD4+CD8+ BCKGRND COR BLD-ACNC: 0.08 IU/ML — SIGNIFICANT CHANGE UP
MAGNESIUM SERPL-MCNC: 2.1 MG/DL — SIGNIFICANT CHANGE UP (ref 1.6–2.6)
MCHC RBC-ENTMCNC: 29.3 PG — SIGNIFICANT CHANGE UP (ref 27–34)
MCHC RBC-ENTMCNC: 33.4 GM/DL — SIGNIFICANT CHANGE UP (ref 32–36)
MCV RBC AUTO: 87.8 FL — SIGNIFICANT CHANGE UP (ref 80–100)
MONOCYTES # BLD AUTO: 0.79 K/UL — SIGNIFICANT CHANGE UP (ref 0–0.9)
MONOCYTES NFR BLD AUTO: 10.8 % — SIGNIFICANT CHANGE UP (ref 2–14)
NEUTROPHILS # BLD AUTO: 4.93 K/UL — SIGNIFICANT CHANGE UP (ref 1.8–7.4)
NEUTROPHILS NFR BLD AUTO: 67.5 % — SIGNIFICANT CHANGE UP (ref 43–77)
NRBC # BLD: 0 /100 WBCS — SIGNIFICANT CHANGE UP (ref 0–0)
PHOSPHATE SERPL-MCNC: 3.6 MG/DL — SIGNIFICANT CHANGE UP (ref 2.5–4.5)
PLATELET # BLD AUTO: 114 K/UL — LOW (ref 150–400)
POTASSIUM SERPL-MCNC: 4.2 MMOL/L — SIGNIFICANT CHANGE UP (ref 3.5–5.3)
POTASSIUM SERPL-SCNC: 4.2 MMOL/L — SIGNIFICANT CHANGE UP (ref 3.5–5.3)
PROT SERPL-MCNC: 6.9 G/DL — SIGNIFICANT CHANGE UP (ref 6–8.3)
QUANT TB PLUS MITOGEN MINUS NIL: >10 IU/ML — SIGNIFICANT CHANGE UP
RBC # BLD: 4.5 M/UL — SIGNIFICANT CHANGE UP (ref 4.2–5.8)
RBC # FLD: 12.6 % — SIGNIFICANT CHANGE UP (ref 10.3–14.5)
SODIUM SERPL-SCNC: 139 MMOL/L — SIGNIFICANT CHANGE UP (ref 135–145)
WBC # BLD: 7.31 K/UL — SIGNIFICANT CHANGE UP (ref 3.8–10.5)
WBC # FLD AUTO: 7.31 K/UL — SIGNIFICANT CHANGE UP (ref 3.8–10.5)

## 2024-05-30 PROCEDURE — 99239 HOSP IP/OBS DSCHRG MGMT >30: CPT | Mod: GC

## 2024-05-30 RX ORDER — FOLIC ACID 0.8 MG
1 TABLET ORAL
Qty: 0 | Refills: 0 | DISCHARGE
Start: 2024-05-30

## 2024-05-30 RX ORDER — ATORVASTATIN CALCIUM 80 MG/1
1 TABLET, FILM COATED ORAL
Qty: 30 | Refills: 0
Start: 2024-05-30 | End: 2024-06-28

## 2024-05-30 RX ORDER — LEVETIRACETAM 250 MG/1
1 TABLET, FILM COATED ORAL
Qty: 60 | Refills: 0
Start: 2024-05-30 | End: 2024-06-28

## 2024-05-30 RX ORDER — LOSARTAN POTASSIUM 100 MG/1
1 TABLET, FILM COATED ORAL
Qty: 30 | Refills: 0
Start: 2024-05-30 | End: 2024-06-28

## 2024-05-30 RX ORDER — CHOLECALCIFEROL (VITAMIN D3) 125 MCG
1000 CAPSULE ORAL
Qty: 0 | Refills: 0 | DISCHARGE
Start: 2024-05-30

## 2024-05-30 RX ORDER — NIFEDIPINE 30 MG
1 TABLET, EXTENDED RELEASE 24 HR ORAL
Qty: 30 | Refills: 0
Start: 2024-05-30 | End: 2024-06-28

## 2024-05-30 RX ORDER — HYDROCHLOROTHIAZIDE 25 MG
1 TABLET ORAL
Qty: 30 | Refills: 0
Start: 2024-05-30 | End: 2024-06-28

## 2024-05-30 RX ADMIN — Medication 60 MILLIGRAM(S): at 05:17

## 2024-05-30 RX ADMIN — ENOXAPARIN SODIUM 40 MILLIGRAM(S): 100 INJECTION SUBCUTANEOUS at 05:19

## 2024-05-30 RX ADMIN — Medication 100 MILLIGRAM(S): at 05:17

## 2024-05-30 RX ADMIN — LEVETIRACETAM 500 MILLIGRAM(S): 250 TABLET, FILM COATED ORAL at 05:17

## 2024-05-30 RX ADMIN — Medication 1 SPRAY(S): at 13:05

## 2024-05-30 RX ADMIN — LOSARTAN POTASSIUM 100 MILLIGRAM(S): 100 TABLET, FILM COATED ORAL at 05:18

## 2024-05-30 RX ADMIN — Medication 1 MILLIGRAM(S): at 13:04

## 2024-05-30 RX ADMIN — Medication 100 MILLIGRAM(S): at 13:04

## 2024-05-30 RX ADMIN — Medication 1 TABLET(S): at 13:04

## 2024-05-30 RX ADMIN — Medication 1000 UNIT(S): at 13:04

## 2024-05-30 RX ADMIN — LIDOCAINE 1 PATCH: 4 CREAM TOPICAL at 13:03

## 2024-05-30 RX ADMIN — Medication 2 SPRAY(S): at 13:05

## 2024-05-30 NOTE — DISCHARGE NOTE PROVIDER - HOSPITAL COURSE
Discharge Summary     Admit Date: 05-25-24  Discharge Date:    Admission diagnoses: Other conditions of brain        Discharge diagnoses:  ***    Hospital Course:   For full details, please see H&P, progress notes, consult notes and ancillary notes. Briefly, MARJORIE PINO is a 48y Male with a history of ***. The patient's hospital course will be summarized in a problem based format.    # ***    # ***    On day of discharge, patient is clinically stable with no new exam findings or acute symptoms compared to prior. The patient was seen by the attending physician on the date of discharge and deemed stable and acceptable for discharge. The patient's chronic medical conditions were treated accordingly per the patient's home medication regimen. The patient's medication reconciliation (with changes made to chronic medications), follow up appointments, discharge orders, instructions, and significant lab and diagnostic studies are as noted.     Discharge follow up action items:     1. Follow up with PCP in 1-2 weeks.   2. Follow up labs, path, & imaging ***  3. Medication changes ***  4. On hold medications ***    Patient's ordered code status: ***    Patient disposition: ***     Discharge Summary     Admit Date: 05-25-24  Discharge Date:    Admission diagnoses: Other conditions of brain    Discharge diagnoses:  L mid posterior temporal lobe hematoma    Hospital Course:   For full details, please see H&P, progress notes, consult notes and ancillary notes. Briefly, MARJORIE PINO is a 48y Male with no known medical history presented to ED after seizure at work. The patient's hospital course will be summarized in a problem based format.    #L mid posterior temporal lobe hematoma   MR of brain was performed which showed a L mid posterior temporal lobe hematoma with small surrounding edema/gliosis, no midline shift. Patient was evaluated by NSGY inpatient with no acute intervention. He was initially empirically covered with IV abx including Vanc/CTX/Flagyl. Patient was evaluated by our Infectious Disease team and given MRI findings without evidence of infection or neoplasm, patient was monitored off antibiotics. Suspect the hematoma is in the setting of uncontrolled hypertension.     #Seizure  Pt with witnessed fall and seizure like activity while at work. He was started on Keppra 500mg BID inpatient. vEEG showed no seizure activity. Patient will be discharged on Keppra and to follow up with NSGY in 1-2 weeks.     #Acute sinusitis  CT max/face with periapical lucencies in the left maxilla involving the roots of the first right upper premolar and molar. There is moderate fluid and mucus in the left maxillary sinus, with mild to moderate opacities in the ethmoid cavities, and the right maxillary sinus. Patient was scoped by ENT with purulent discharge. No fungus ball and were unable to be reached for culture. Dental was consulted for possible odontogenic source of sinusitis and recommended for follow up outpatient dentist for extraction of root tips and comprehensive dental care.    #Tongue lesion.   Dental evaluation with concerning lesions on tongue for possible SCC. He is recommended for outpatient consult with [facility]ology for tongue lesions for possible [phone].    #Hypertension.   Pt with BP's >200>110's in ED. Asymptomatic.   He was started on Losartan 100mg, Nifedipine 60mg and HCTZ 12.5mg daily with improvement in pressure. He will require outpatient monitoring of blood pressure to further optimize control.     On day of discharge, patient is clinically stable with no new exam findings or acute symptoms compared to prior. The patient was seen by the attending physician on the date of discharge and deemed stable and acceptable for discharge. The patient's chronic medical conditions were treated accordingly per the patient's home medication regimen. The patient's medication reconciliation (with changes made to chronic medications), follow up appointments, discharge orders, instructions, and significant lab and diagnostic studies are as noted.     Discharge follow up action items:     1. Follow up with PCP in 1-2 weeks. Follow up with NSGY in 1-2 weeks. Follow up with Dentist and Oral Pathology in 1-2 weeks.  2. Medication changes Started on Losartan, Nifedipine, HCTZ, Lipitor     Patient's ordered code status: Full Code    Patient disposition: Home

## 2024-05-30 NOTE — DISCHARGE NOTE PROVIDER - NSDCFUADDAPPT_GEN_ALL_CORE_FT
APPTS ARE READY TO BE MADE: [X] YES    Best Family or Patient Contact (if needed):    Additional Information about above appointments (if needed):    1: PCP appointment in 1-2 weeks  2: NSGY follow up with Dr. Moe 1-2 weeks  3: [facility] Oral Pathology follow up  4. Dental clinic follow up    Other comments or requests:    APPTS ARE READY TO BE MADE: [X] YES    Best Family or Patient Contact (if needed):    Additional Information about above appointments (if needed):    1: PCP appointment in 1-2 weeks  2: NSGY follow up with Dr. Moe 1-2 weeks  3: J Oral Pathology follow up  4. Dental clinic follow up    Other comments or requests:     Patient advised they did not want to proceed with scheduling appointments with the providers on their referrals. They will coordinate care on their own.

## 2024-05-30 NOTE — PROGRESS NOTE ADULT - PROBLEM SELECTOR PLAN 8
Diet: Dash, consistent carb  DVT Proph: lovenox  Dispo: Pending course

## 2024-05-30 NOTE — PROGRESS NOTE ADULT - PROBLEM SELECTOR PLAN 4
- Pt drinks 1L of whiskey nightly before bed. Unclear if he develops withdrawal seizures or DTs  - Last drink 5/24 evening?  - Monitor on CIWA - d/c 5/28   - Thiamine (high dose), folate, multivitamin

## 2024-05-30 NOTE — PROGRESS NOTE ADULT - PROBLEM SELECTOR PLAN 2
- Pt with witnessed fall and seizure like activity. Possible post ictal?  - Withdrawal vs brain lesion related  - Given keppra 1000 IV in ED. Continue with 500mg BID per NSX  - vEEG - no seizure activity recorded
- Pt with witnessed fall and seizure like activity. Possible post ictal?  - Withdrawal vs brain lesion related  - Given keppra 1000 IV in ED. Continue with 500mg BID per NSX  - vEEG - no seizure activity recorded
- Pt with witnessed fall and seizure like activity. Possible post ictal?  - Withdrawal vs brain lesion related  - Given keppra 1000 IV in ED. Continue with 500mg BID per NSX  - vEEG
- Pt with witnessed fall and seizure like activity. Possible post ictal?  - Withdrawal vs brain lesion related  - Given keppra 1000 IV in ED. Continue with 500mg BID per NSX  - Check EEG
- Pt with witnessed fall and seizure like activity. Possible post ictal?  - Withdrawal vs brain lesion related  - Given keppra 1000 IV in ED. Continue with 500mg BID per NSX  - vEEG

## 2024-05-30 NOTE — PROGRESS NOTE ADULT - ATTENDING COMMENTS
-D/w patient importance of staying on anti-HTN meds and keppra and f/u with medical clinic and NSGY outpt to improve BP and avoid brain bleed. Used  Anabelle [id number]. He expressed understanding. SW assistance with obtaining meds appreciated. Outpt f/u with medical clinic. Overall BP improving but gradually and needs more time on nifedipine, losartan, and HCTZ for them to start working more. Also, d/w patient importance of alcohol avoidance to reduce bleeding risk.   -35 minutes spent on the DC process. -D/w patient importance of staying on anti-HTN meds and keppra and f/u with medical clinic and NSGY outpt to improve BP and avoid brain bleed. Used  Anabelle [id number]. He expressed understanding. SW assistance with obtaining meds appreciated. Outpt f/u with medical clinic. Overall BP improving but gradually and needs more time on nifedipine, losartan, and HCTZ for them to start working more. Also, d/w patient importance of alcohol avoidance to reduce bleeding risk.   -35 minutes spent on the DC process. -D/w house staff and RN.

## 2024-05-30 NOTE — DISCHARGE NOTE PROVIDER - PROVIDER TOKENS
PROVIDER:[TOKEN:[[id number]],FOLLOWUP:[1 week]],FREE:[LAST:[[facility]],PHONE:[phone]],FAX:[(   )    -]]

## 2024-05-30 NOTE — DISCHARGE NOTE PROVIDER - CARE PROVIDER_API CALL
Kendrick Moe  Neurosurgery  [address], [address]  [place] [address]  Phone: [phone]  Fax: [phone]  Follow Up Time: 1 week    [facility],   Phone: [phone]  Fax: (   )    -  Follow Up Time:

## 2024-05-30 NOTE — PROGRESS NOTE ADULT - PROBLEM SELECTOR PLAN 5
- Outpatient consult with [facility] Oral [facility]ology for tongue lesions for possible [phone].  - Dental F/U with outpatient dentist for extraction of root tips and comprehensive dental care.

## 2024-05-30 NOTE — DISCHARGE NOTE NURSING/CASE MANAGEMENT/SOCIAL WORK - PATIENT PORTAL LINK FT
You can access the FollowMyHealth Patient Portal offered by [facility] by registering at the following website: [email or web address]. By joining [facility]’s FollowMyHealth portal, you will also be able to view your health information using other applications (apps) compatible with our system.

## 2024-05-30 NOTE — DISCHARGE NOTE PROVIDER - NSFOLLOWUPCLINICS_GEN_ALL_ED_FT
[facility] [place]  Internal Medicine  [address] [place] [address]  Phone: [phone]  Fax: [phone]     [facility]  Dental  [address] [place] [address]  Phone: [phone]  Fax:   Follow Up Time: 1 week    [facility] - [facility]  Primary Care  [address] [place] [address]  Phone: [phone]  Fax:   Follow Up Time: 1 week

## 2024-05-30 NOTE — PROGRESS NOTE ADULT - REASON FOR ADMISSION
Seizure, brain mass

## 2024-05-30 NOTE — PROGRESS NOTE ADULT - PROBLEM SELECTOR PLAN 6
- Pt with BP's >200>110's in ED. Asymptomatic  - Start losartan 25mg qd to bring down pressures slowly. Increase to 50mg qdaily (5/28 -   - Start nifedipine 30mg qdaily - increased to 60mg (5/29 -   - Started HCTZ 12.5mg qdaily   - PRN hydralazine for Systolic >180 - Pt with BP's >200>110's in ED. Asymptomatic  - Start losartan 25mg qd to bring down pressures slowly. Increase to 50mg qdaily (5/28 -   - Start nifedipine 30mg qdaily - increased to 60mg (5/29 -   - Started HCTZ 12.5mg qdaily - increase to 25 mg qdaily   - PRN hydralazine for Systolic >180

## 2024-05-30 NOTE — PROGRESS NOTE ADULT - SUBJECTIVE AND OBJECTIVE BOX
DATE OF SERVICE: 05-30-24 @ 10:04    Patient is a 48y old  Male who presents with a chief complaint of Seizure, brain mass (30 May 2024 09:18)      SUBJECTIVE / OVERNIGHT EVENTS:  (Shelia [id number]). No acute events overnight. Patient denies any complaints this morning. Plan for patient discharge home.    MEDICATIONS  (STANDING):  atorvastatin 80 milliGRAM(s) Oral at bedtime  benzonatate 100 milliGRAM(s) Oral three times a day  chlorhexidine 2% Cloths 1 Application(s) Topical daily  cholecalciferol 1000 Unit(s) Oral daily  enoxaparin Injectable 40 milliGRAM(s) SubCutaneous every 24 hours  fluticasone propionate 50 MICROgram(s)/spray Nasal Spray 1 Spray(s) Both Nostrils <User Schedule>  folic acid 1 milliGRAM(s) Oral daily  hydrochlorothiazide 12.5 milliGRAM(s) Oral daily  levETIRAcetam 500 milliGRAM(s) Oral two times a day  lidocaine   4% Patch 1 Patch Transdermal every 24 hours  losartan 100 milliGRAM(s) Oral daily  multivitamin 1 Tablet(s) Oral daily  NIFEdipine XL 60 milliGRAM(s) Oral daily  sodium chloride 0.65% Nasal 2 Spray(s) Both Nostrils five times a day    MEDICATIONS  (PRN):  acetaminophen     Tablet .. 650 milliGRAM(s) Oral every 6 hours PRN Mild Pain (1 - 3)  guaiFENesin  milliGRAM(s) Oral every 12 hours PRN Cough  hydrALAZINE Injectable 10 milliGRAM(s) IV Push every 4 hours PRN SBP>180      Vital Signs Last 24 Hrs  T(C): 36.9 (30 May 2024 04:01), Max: 37.4 (29 May 2024 20:37)  T(F): 98.5 (30 May 2024 04:01), Max: 99.4 (29 May 2024 20:37)  HR: 90 (30 May 2024 04:01) (80 - 90)  BP: 168/95 (30 May 2024 04:01) (166/83 - 177/87)  BP(mean): --  RR: 17 (30 May 2024 04:01) (17 - 18)  SpO2: 92% (30 May 2024 04:01) (92% - 98%)    Parameters below as of 30 May 2024 04:01  Patient On (Oxygen Delivery Method): room air      CAPILLARY BLOOD GLUCOSE        I&O's Summary    29 May 2024 07:01  -  30 May 2024 07:00  --------------------------------------------------------  IN: 600 mL / OUT: 0 mL / NET: 600 mL        PHYSICAL EXAM:  GENERAL: NAD, well-developed  HEAD:  Atraumatic, Normocephalic  EYES: EOMI, PERRLA, medial pterygium on both eyes  NECK: Supple, No JVD  CHEST/LUNG: Clear to auscultation bilaterally; No wheeze, coughing   HEART: Regular rate and rhythm; No murmurs  ABDOMEN: Soft, Nontender, Nondistended  EXTREMITIES:  2+ Peripheral Pulses, No edema, clubbing present   PSYCH: AAOx3  NEUROLOGY: non-focal  SKIN: No rashes or lesions    LABS:                        13.2   7.31  )-----------( 114      ( 30 May 2024 06:13 )             39.5     05-30    139  |  102  |  15  ----------------------------<  112<H>  4.2   |  24  |  0.83    Ca    8.9      30 May 2024 06:12  Phos  3.6     05-30  Mg     2.1     05-30    TPro  6.9  /  Alb  3.8  /  TBili  0.7  /  DBili  x   /  AST  43<H>  /  ALT  60<H>  /  AlkPhos  119  05-30          Urinalysis Basic - ( 30 May 2024 06:12 )    Color: x / Appearance: x / SG: x / pH: x  Gluc: 112 mg/dL / Ketone: x  / Bili: x / Urobili: x   Blood: x / Protein: x / Nitrite: x   Leuk Esterase: x / RBC: x / WBC x   Sq Epi: x / Non Sq Epi: x / Bacteria: x        RADIOLOGY & ADDITIONAL TESTS:    Imaging Personally Reviewed:    Consultant(s) Notes Reviewed:      Care Discussed with Consultants/Other Providers:   DATE OF SERVICE: 05-30-24 @ 10:04    Patient is a 48y old  Male who presents with a chief complaint of Seizure, brain mass (30 May 2024 09:18)      SUBJECTIVE / OVERNIGHT EVENTS:  (Shelia [id number]). No acute events overnight. Patient denies any complaints this morning. Plan for patient discharge home. Patient states he has about $20 for medications, working with social work to obtain meds for patient.     MEDICATIONS  (STANDING):  atorvastatin 80 milliGRAM(s) Oral at bedtime  benzonatate 100 milliGRAM(s) Oral three times a day  chlorhexidine 2% Cloths 1 Application(s) Topical daily  cholecalciferol 1000 Unit(s) Oral daily  enoxaparin Injectable 40 milliGRAM(s) SubCutaneous every 24 hours  fluticasone propionate 50 MICROgram(s)/spray Nasal Spray 1 Spray(s) Both Nostrils <User Schedule>  folic acid 1 milliGRAM(s) Oral daily  hydrochlorothiazide 12.5 milliGRAM(s) Oral daily  levETIRAcetam 500 milliGRAM(s) Oral two times a day  lidocaine   4% Patch 1 Patch Transdermal every 24 hours  losartan 100 milliGRAM(s) Oral daily  multivitamin 1 Tablet(s) Oral daily  NIFEdipine XL 60 milliGRAM(s) Oral daily  sodium chloride 0.65% Nasal 2 Spray(s) Both Nostrils five times a day    MEDICATIONS  (PRN):  acetaminophen     Tablet .. 650 milliGRAM(s) Oral every 6 hours PRN Mild Pain (1 - 3)  guaiFENesin  milliGRAM(s) Oral every 12 hours PRN Cough  hydrALAZINE Injectable 10 milliGRAM(s) IV Push every 4 hours PRN SBP>180      Vital Signs Last 24 Hrs  T(C): 36.9 (30 May 2024 04:01), Max: 37.4 (29 May 2024 20:37)  T(F): 98.5 (30 May 2024 04:01), Max: 99.4 (29 May 2024 20:37)  HR: 90 (30 May 2024 04:01) (80 - 90)  BP: 168/95 (30 May 2024 04:01) (166/83 - 177/87)  BP(mean): --  RR: 17 (30 May 2024 04:01) (17 - 18)  SpO2: 92% (30 May 2024 04:01) (92% - 98%)    Parameters below as of 30 May 2024 04:01  Patient On (Oxygen Delivery Method): room air      CAPILLARY BLOOD GLUCOSE        I&O's Summary    29 May 2024 07:01  -  30 May 2024 07:00  --------------------------------------------------------  IN: 600 mL / OUT: 0 mL / NET: 600 mL        PHYSICAL EXAM:  GENERAL: NAD, well-developed  HEAD:  Atraumatic, Normocephalic  EYES: EOMI, PERRLA, medial pterygium on both eyes  NECK: Supple, No JVD  CHEST/LUNG: Clear to auscultation bilaterally; No wheeze, coughing   HEART: Regular rate and rhythm; No murmurs  ABDOMEN: Soft, Nontender, Nondistended  EXTREMITIES:  2+ Peripheral Pulses, No edema, clubbing present   PSYCH: AAOx3  NEUROLOGY: non-focal  SKIN: No rashes or lesions    LABS:                        13.2   7.31  )-----------( 114      ( 30 May 2024 06:13 )             39.5     05-30    139  |  102  |  15  ----------------------------<  112<H>  4.2   |  24  |  0.83    Ca    8.9      30 May 2024 06:12  Phos  3.6     05-30  Mg     2.1     05-30    TPro  6.9  /  Alb  3.8  /  TBili  0.7  /  DBili  x   /  AST  43<H>  /  ALT  60<H>  /  AlkPhos  119  05-30          Urinalysis Basic - ( 30 May 2024 06:12 )    Color: x / Appearance: x / SG: x / pH: x  Gluc: 112 mg/dL / Ketone: x  / Bili: x / Urobili: x   Blood: x / Protein: x / Nitrite: x   Leuk Esterase: x / RBC: x / WBC x   Sq Epi: x / Non Sq Epi: x / Bacteria: x        RADIOLOGY & ADDITIONAL TESTS:    Imaging Personally Reviewed:    Consultant(s) Notes Reviewed:      Care Discussed with Consultants/Other Providers:

## 2024-05-30 NOTE — DISCHARGE NOTE PROVIDER - NSDCMRMEDTOKEN_GEN_ALL_CORE_FT
atorvastatin 80 mg oral tablet: 1 tab(s) orally once a day (at bedtime)  cholecalciferol oral tablet: 1000 unit(s) orally once a day  folic acid 1 mg oral tablet: 1 tab(s) orally once a day  hydroCHLOROthiazide 12.5 mg oral capsule: 1 cap(s) orally once a day  levETIRAcetam 500 mg oral tablet: 1 tab(s) orally 2 times a day  losartan 100 mg oral tablet: 1 tab(s) orally once a day  Multiple Vitamins oral tablet: 1 tab(s) orally once a day  NIFEdipine 60 mg oral tablet, extended release: 1 tab(s) orally once a day

## 2024-05-30 NOTE — DISCHARGE NOTE NURSING/CASE MANAGEMENT/SOCIAL WORK - NSDCFUADDAPPT_GEN_ALL_CORE_FT
Follow up for sliding scale internal medicine appointment at [facility], [address] [place] [address], [address], [place] [address]  phone: [phone]

## 2024-05-30 NOTE — DISCHARGE NOTE PROVIDER - NSDCCPCAREPLAN_GEN_ALL_CORE_FT
PRINCIPAL DISCHARGE DIAGNOSIS  Diagnosis: Seizure  Assessment and Plan of Treatment: You presented to the hospital after you had seizure like activity and a fall at work. We performed imaging which showed you have a blood collection on the left side of your brain which likely cause your seizure. We suspect the blood collection formed because of your uncontrolled blood pressure. You were started on seizure medication in the hospital and your EEG report did not show any recurrence of seizure activity. You were also evaluated by our Neurosurgery team, please follow up with them outpatient.      SECONDARY DISCHARGE DIAGNOSES  Diagnosis: Hypertension  Assessment and Plan of Treatment: Your blood pressure was found to be very elevated in the hospital. You were started on three different blood pressure medications. Please take the Losartan, Nifedipine and Hydrocholorothiazide as directed. Please follow up with a primary care provider for further managment of your high blood pressure.

## 2024-05-30 NOTE — DISCHARGE NOTE PROVIDER - CARE PROVIDERS DIRECT ADDRESSES
[email or web address]

## 2024-05-30 NOTE — DISCHARGE NOTE PROVIDER - NSDCCPTREATMENT_GEN_ALL_CORE_FT
PRINCIPAL PROCEDURE  Procedure: MRI  Findings and Treatment: Within the left mid-posterior temporal lobe middle gyrus there is a   subacute hematoma. This is 2.6 x 2.1 cm (transverse x AP on axial image   15) and 2.8 cm length on sagittal image 21, ovoid/oblong in shape with T1   hyperintense and T2 hyperintense content consistent with late subacute   age. There is peripheral hemosiderin darker signal and more hazy   hemosiderin staining adjacent to it.  There is no internal or external enhancement to suggest neoplasm or   infection. Faint T2 FLAIR signal around the lesion may be ensuing gliosis   rather than interstitial edema. There is no mass effect on the adjacent   left lateral ventricle. No hydrocephalus, midline shift or extra-axial   collection.  Diffusion imaging is negative for recent infarct. The susceptibility   images elsewhere demonstrate mild microhemorrhage in the right putamen.   Scattered chronic microangiopathic signal appreciated in periventricular   and subcortical white matter on T2-FLAIR. Large vessel flow voids are   preserved.  The postcontrast images demonstrate no abnormal enhancement. Signal at   the left temporal hematoma matches the precontrast T1 series.  Of additional pertinence, dural venous sinuses and large cortical veins   appear patent without evidence of venous thrombosis as to explain   cerebral hemorrhage.  There is inflammatory opacification in left greater than right paranasal   sinuses. No fluid level. Mastoids are clear. Orbits and skull base are   grossly unremarkable.  IMPRESSION:  Left mid-posterior temporal lobe hematoma, late-subacute age based on MRI   signal, measuring up to 2.8 cm diameter and with small surrounding   gliosis vs edema. No ventricular mass effect or midline shift.  No enhancing lesion to suggest neoplasia or infection.      SECONDARY PROCEDURE  Procedure: CT abdomen  Findings and Treatment: FINDINGS:  LOWER CHEST: Right lower lobe linear atelectasis.  LIVER: Diffuse hepatic steatosis. The liver surface contour is grossly   smooth. No discrete hepatic lesion identified.  BILE DUCTS: Normal caliber.  GALLBLADDER: Within normal limits.  SPLEEN: Within normal limits.  PANCREAS: Within normal limits.  ADRENALS: Within normal limits.  KIDNEYS/URETERS: Duplicated right renal collecting system with possible   fusion to a common ureter just proximal to the UVJ. There is a 1.4 cm   simple exophytic right lower pole renal cyst (4-60). The kidneys are   otherwise within normal limits.  BLADDER: Within normal limits.  REPRODUCTIVE ORGANS: Normal-sized prostate.  BOWEL: No bowel obstruction. Appendix is normal.  PERITONEUM: No ascites.  VESSELS: Atherosclerotic changes. No evidence of aneurysm.  RETROPERITONEUM/LYMPH NODES: No lymphadenopathy.  ABDOMINAL WALL: Within normal limits.  BONES: Degenerative changes.  IMPRESSION:  No acute intra-abdominal findings.  Hepatic steatosis.

## 2024-05-30 NOTE — PROGRESS NOTE ADULT - PROBLEM SELECTOR PLAN 3
- CT max/face with periapical lucencies in the left maxilla involving the roots of the first right upper premolar and molar. There is moderate fluid and mucus in the left maxillary sinus, with mild to moderate opacities in the ethmoid cavities, and the right maxillary sinus.  - Scoped by ENT with purulent discharge. No fungus ball. Unable to be reached for culture  - Possible odontogenic source - dental consulted   - Check TTE to eval for endocarditis
- CT max/face with periapical lucencies in the left maxilla involving the roots of the first right upper premolar and molar. There is moderate fluid and mucus in the left maxillary sinus, with mild to moderate opacities in the ethmoid cavities, and the right maxillary sinus.  - Scoped by ENT with purulent discharge. No fungus ball. Unable to be reached for culture  - Possible odontogenic source - dental consulted   - Check TTE to eval for endocarditis   - Abx as above
- CT max/face with periapical lucencies in the left maxilla involving the roots of the first right upper premolar and molar. There is moderate fluid and mucus in the left maxillary sinus, with mild to moderate opacities in the ethmoid cavities, and the right maxillary sinus.  - Scoped by ENT with purulent discharge. No fungus ball. Unable to be reached for culture  - Possible odontogenic source - dental consulted   - Check A1C, lipids  - Check TTE to eval for endocarditis   - Abx as above
- CT max/face with periapical lucencies in the left maxilla involving the roots of the first right upper premolar and molar. There is moderate fluid and mucus in the left maxillary sinus, with mild to moderate opacities in the ethmoid cavities, and the right maxillary sinus.  - Scoped by ENT with purulent discharge. No fungus ball. Unable to be reached for culture  - Possible odontogenic source - dental consulted   - Check A1C, lipids  - Check TTE to eval for endocarditis   - Abx as above
- CT max/face with periapical lucencies in the left maxilla involving the roots of the first right upper premolar and molar. There is moderate fluid and mucus in the left maxillary sinus, with mild to moderate opacities in the ethmoid cavities, and the right maxillary sinus.  - Scoped by ENT with purulent discharge. No fungus ball. Unable to be reached for culture  - Possible odontogenic source - dental consulted - outpatient follow up

## 2024-05-31 LAB
CULTURE RESULTS: SIGNIFICANT CHANGE UP
CULTURE RESULTS: SIGNIFICANT CHANGE UP
SPECIMEN SOURCE: SIGNIFICANT CHANGE UP
SPECIMEN SOURCE: SIGNIFICANT CHANGE UP

## 2024-08-22 PROCEDURE — 96375 TX/PRO/DX INJ NEW DRUG ADDON: CPT

## 2024-08-22 PROCEDURE — 87340 HEPATITIS B SURFACE AG IA: CPT

## 2024-08-22 PROCEDURE — 70450 CT HEAD/BRAIN W/O DYE: CPT | Mod: MC

## 2024-08-22 PROCEDURE — 83690 ASSAY OF LIPASE: CPT

## 2024-08-22 PROCEDURE — 93005 ELECTROCARDIOGRAM TRACING: CPT

## 2024-08-22 PROCEDURE — T1013: CPT

## 2024-08-22 PROCEDURE — 86705 HEP B CORE ANTIBODY IGM: CPT

## 2024-08-22 PROCEDURE — 94640 AIRWAY INHALATION TREATMENT: CPT

## 2024-08-22 PROCEDURE — 95700 EEG CONT REC W/VID EEG TECH: CPT

## 2024-08-22 PROCEDURE — 73030 X-RAY EXAM OF SHOULDER: CPT

## 2024-08-22 PROCEDURE — 86803 HEPATITIS C AB TEST: CPT

## 2024-08-22 PROCEDURE — 70553 MRI BRAIN STEM W/O & W/DYE: CPT | Mod: MC

## 2024-08-22 PROCEDURE — 80061 LIPID PANEL: CPT

## 2024-08-22 PROCEDURE — 85025 COMPLETE CBC W/AUTO DIFF WBC: CPT

## 2024-08-22 PROCEDURE — 80202 ASSAY OF VANCOMYCIN: CPT

## 2024-08-22 PROCEDURE — 83735 ASSAY OF MAGNESIUM: CPT

## 2024-08-22 PROCEDURE — 82330 ASSAY OF CALCIUM: CPT

## 2024-08-22 PROCEDURE — 86706 HEP B SURFACE ANTIBODY: CPT

## 2024-08-22 PROCEDURE — 86480 TB TEST CELL IMMUN MEASURE: CPT

## 2024-08-22 PROCEDURE — 71045 X-RAY EXAM CHEST 1 VIEW: CPT

## 2024-08-22 PROCEDURE — 86709 HEPATITIS A IGM ANTIBODY: CPT

## 2024-08-22 PROCEDURE — A9585: CPT

## 2024-08-22 PROCEDURE — 85610 PROTHROMBIN TIME: CPT

## 2024-08-22 PROCEDURE — 87640 STAPH A DNA AMP PROBE: CPT

## 2024-08-22 PROCEDURE — 82947 ASSAY GLUCOSE BLOOD QUANT: CPT

## 2024-08-22 PROCEDURE — 83605 ASSAY OF LACTIC ACID: CPT

## 2024-08-22 PROCEDURE — 84295 ASSAY OF SERUM SODIUM: CPT

## 2024-08-22 PROCEDURE — 96374 THER/PROPH/DIAG INJ IV PUSH: CPT

## 2024-08-22 PROCEDURE — 82803 BLOOD GASES ANY COMBINATION: CPT

## 2024-08-22 PROCEDURE — 80053 COMPREHEN METABOLIC PANEL: CPT

## 2024-08-22 PROCEDURE — 85730 THROMBOPLASTIN TIME PARTIAL: CPT

## 2024-08-22 PROCEDURE — 82550 ASSAY OF CK (CPK): CPT

## 2024-08-22 PROCEDURE — 81001 URINALYSIS AUTO W/SCOPE: CPT

## 2024-08-22 PROCEDURE — 95714 VEEG EA 12-26 HR UNMNTR: CPT

## 2024-08-22 PROCEDURE — 86704 HEP B CORE ANTIBODY TOTAL: CPT

## 2024-08-22 PROCEDURE — 83036 HEMOGLOBIN GLYCOSYLATED A1C: CPT

## 2024-08-22 PROCEDURE — 84484 ASSAY OF TROPONIN QUANT: CPT

## 2024-08-22 PROCEDURE — 82962 GLUCOSE BLOOD TEST: CPT

## 2024-08-22 PROCEDURE — 85018 HEMOGLOBIN: CPT

## 2024-08-22 PROCEDURE — 82306 VITAMIN D 25 HYDROXY: CPT

## 2024-08-22 PROCEDURE — 70486 CT MAXILLOFACIAL W/O DYE: CPT | Mod: MC

## 2024-08-22 PROCEDURE — 74177 CT ABD & PELVIS W/CONTRAST: CPT | Mod: MC

## 2024-08-22 PROCEDURE — 36415 COLL VENOUS BLD VENIPUNCTURE: CPT

## 2024-08-22 PROCEDURE — 87641 MR-STAPH DNA AMP PROBE: CPT

## 2024-08-22 PROCEDURE — 87040 BLOOD CULTURE FOR BACTERIA: CPT

## 2024-08-22 PROCEDURE — 76377 3D RENDER W/INTRP POSTPROCES: CPT

## 2024-08-22 PROCEDURE — 84132 ASSAY OF SERUM POTASSIUM: CPT

## 2024-08-22 PROCEDURE — 82435 ASSAY OF BLOOD CHLORIDE: CPT

## 2024-08-22 PROCEDURE — 80307 DRUG TEST PRSMV CHEM ANLYZR: CPT

## 2024-08-22 PROCEDURE — 84100 ASSAY OF PHOSPHORUS: CPT

## 2024-08-22 PROCEDURE — 86708 HEPATITIS A ANTIBODY: CPT

## 2024-08-22 PROCEDURE — 87637 SARSCOV2&INF A&B&RSV AMP PRB: CPT

## 2024-08-22 PROCEDURE — 84443 ASSAY THYROID STIM HORMONE: CPT

## 2024-08-22 PROCEDURE — 85014 HEMATOCRIT: CPT

## 2024-08-22 PROCEDURE — 87389 HIV-1 AG W/HIV-1&-2 AB AG IA: CPT

## 2024-08-22 PROCEDURE — 99285 EMERGENCY DEPT VISIT HI MDM: CPT | Mod: 25

## 2024-08-22 PROCEDURE — 80048 BASIC METABOLIC PNL TOTAL CA: CPT

## 2024-08-22 PROCEDURE — 82746 ASSAY OF FOLIC ACID SERUM: CPT

## 2025-02-10 NOTE — PROVIDER CONTACT NOTE (OTHER) - NAME OF MD/NP/PA/DO NOTIFIED:
Michelle Cunha MD
[FreeTextEntry1] : 24 yo obese (BMI 32) Female w/ Hx of anemia attributed to heavy periods, requiring blood transfusion in past (2021, Hb was ~7), lower back pain (requiring ER visits, on Celebrex and Flexeril), possible PCOS, s/p cholecystectomy (11/2023), and renal cysts, was referred by PCP (Dr. Devicka Persaud) for incidentally found liver lesion.   Ordered repeat MRI abd, Fibroscan to assess background liver and hepatitis viral serologies to assess her risk factors, as well as tumor markers and assessing for metabolic risk factors.  RTC w/ above results in 3 weeks
Catalino Painting
Catalino Painting MD
Garima Bae
Michael Ying

## 2025-04-17 NOTE — PROGRESS NOTE ADULT - PROBLEM SELECTOR PLAN 5
Medication changes made today:  None      Tests Ordered:  None      Follow up:  In office in 12 months Dr Lr      Understanding your instructions:  If you feel that things may have been explained in a way that you do not understand or did not receive easy to understand instruction, please contact me at [phone] and I would be more than happy to review your plan of care with you. Your healthcare is important to us and we want to make sure you understand your directions.    Our Electrophysiology Team will continue to collaborate and work very closely together to best meet your needs.    Thank you for choosing us to take care of your electrophysiology needs. It is a pleasure to work with you, and again, please contact us for any questions or concerns anytime.     - Pt with BP's >200>110's in ED. Asymptomatic  - Start losartan 25mg qd to bring down pressures slowly. Aim for MAP ~110 for next 24 hours - Outpatient consult with [facility] Oral Pathology for tongue lesions for possible [phone].  - Dental F/U with outpatient dentist for extraction of root tips and comprehensive dental care.